# Patient Record
Sex: MALE | Race: WHITE | NOT HISPANIC OR LATINO | Employment: OTHER | URBAN - METROPOLITAN AREA
[De-identification: names, ages, dates, MRNs, and addresses within clinical notes are randomized per-mention and may not be internally consistent; named-entity substitution may affect disease eponyms.]

---

## 2018-07-27 ENCOUNTER — HOSPITAL ENCOUNTER (EMERGENCY)
Facility: HOSPITAL | Age: 78
Discharge: HOME | End: 2018-07-27
Attending: EMERGENCY MEDICINE
Payer: MEDICARE

## 2018-07-27 VITALS
HEART RATE: 60 BPM | SYSTOLIC BLOOD PRESSURE: 119 MMHG | WEIGHT: 175 LBS | RESPIRATION RATE: 18 BRPM | HEIGHT: 70 IN | DIASTOLIC BLOOD PRESSURE: 72 MMHG | TEMPERATURE: 98 F | BODY MASS INDEX: 25.05 KG/M2 | OXYGEN SATURATION: 95 %

## 2018-07-27 DIAGNOSIS — R31.9 HEMATURIA, UNSPECIFIED TYPE: Primary | ICD-10-CM

## 2018-07-27 LAB
AMORPH CRY URNS QL MICRO: ABNORMAL
ANION GAP SERPL CALC-SCNC: 10 MMOL/L
APTT BLDCRRT: 25.1 SEC
BACTERIA #/AREA URNS HPF: ABNORMAL /HPF
BASOPHILS # BLD AUTO: 0.02 K/UL
BASOPHILS NFR BLD: 0.3 %
BILIRUB UR QL STRIP: NEGATIVE
BUN SERPL-MCNC: 27 MG/DL
CALCIUM SERPL-MCNC: 10 MG/DL
CHLORIDE SERPL-SCNC: 109 MMOL/L
CLARITY UR: ABNORMAL
CO2 SERPL-SCNC: 24 MMOL/L
COLOR UR: ABNORMAL
CREAT SERPL-MCNC: 1.2 MG/DL
DIFFERENTIAL METHOD: NORMAL
EOSINOPHIL # BLD AUTO: 0.3 K/UL
EOSINOPHIL NFR BLD: 5.2 %
ERYTHROCYTE [DISTWIDTH] IN BLOOD BY AUTOMATED COUNT: 13.9 %
EST. GFR  (AFRICAN AMERICAN): >60 ML/MIN/1.73 M^2
EST. GFR  (NON AFRICAN AMERICAN): 58 ML/MIN/1.73 M^2
GLUCOSE SERPL-MCNC: 91 MG/DL
GLUCOSE UR QL STRIP: ABNORMAL
HCT VFR BLD AUTO: 43.6 %
HGB BLD-MCNC: 14.5 G/DL
HGB UR QL STRIP: ABNORMAL
HYALINE CASTS #/AREA URNS LPF: 0 /LPF
INR PPP: 1
KETONES UR QL STRIP: ABNORMAL
LEUKOCYTE ESTERASE UR QL STRIP: NEGATIVE
LYMPHOCYTES # BLD AUTO: 1.8 K/UL
LYMPHOCYTES NFR BLD: 27 %
MCH RBC QN AUTO: 30 PG
MCHC RBC AUTO-ENTMCNC: 33.3 G/DL
MCV RBC AUTO: 90 FL
MICROSCOPIC COMMENT: ABNORMAL
MONOCYTES # BLD AUTO: 0.9 K/UL
MONOCYTES NFR BLD: 13.9 %
NEUTROPHILS # BLD AUTO: 3.5 K/UL
NEUTROPHILS NFR BLD: 53.4 %
NITRITE UR QL STRIP: NEGATIVE
PH UR STRIP: 6 [PH] (ref 5–8)
PLATELET # BLD AUTO: 218 K/UL
PMV BLD AUTO: 9.5 FL
POTASSIUM SERPL-SCNC: 3.9 MMOL/L
PROT UR QL STRIP: ABNORMAL
PROTHROMBIN TIME: 10.9 SEC
RBC # BLD AUTO: 4.84 M/UL
RBC #/AREA URNS HPF: >100 /HPF (ref 0–4)
SODIUM SERPL-SCNC: 143 MMOL/L
SP GR UR STRIP: 1.02 (ref 1–1.03)
SQUAMOUS #/AREA URNS HPF: 2 /HPF
URN SPEC COLLECT METH UR: ABNORMAL
UROBILINOGEN UR STRIP-ACNC: NEGATIVE EU/DL
WBC # BLD AUTO: 6.48 K/UL
WBC #/AREA URNS HPF: 0 /HPF (ref 0–5)

## 2018-07-27 PROCEDURE — 85025 COMPLETE CBC W/AUTO DIFF WBC: CPT

## 2018-07-27 PROCEDURE — 85730 THROMBOPLASTIN TIME PARTIAL: CPT

## 2018-07-27 PROCEDURE — 81000 URINALYSIS NONAUTO W/SCOPE: CPT

## 2018-07-27 PROCEDURE — 80048 BASIC METABOLIC PNL TOTAL CA: CPT

## 2018-07-27 PROCEDURE — 85610 PROTHROMBIN TIME: CPT

## 2018-07-27 PROCEDURE — 99283 EMERGENCY DEPT VISIT LOW MDM: CPT

## 2018-07-27 RX ORDER — METOPROLOL SUCCINATE 25 MG/1
25 TABLET, EXTENDED RELEASE ORAL DAILY
COMMUNITY

## 2018-07-27 RX ORDER — ATORVASTATIN CALCIUM 80 MG/1
80 TABLET, FILM COATED ORAL DAILY
COMMUNITY
End: 2020-11-09

## 2018-07-27 NOTE — ED TRIAGE NOTES
Pt states at 2 pm he went to the bathroom to urinate noted a brown blood stain; says it was not painful to urinate. Reports he was gardening,  it was very hot and he was sweating, also did some sit-up today. The pt went back to the bathroom again at 6 pm noted his urine was bright red and decided to come to the ER.

## 2018-07-27 NOTE — ED PROVIDER NOTES
Encounter Date: 7/27/2018  SORT: This is a 78 y.o. male with PMHx prostate cancer who presents for emergent consideration of hematuria. Patient reports 2 episodes on painless hematuria earlier today following strenuous exercise. Initial exam: unrevealing. Patient will be moved to a room when one is available. Orders placed.  MARTHA Cyr PA-C        History     Chief Complaint   Patient presents with    Hematuria     Pt with hx of prostate cancer reports 2 episodes of hematuria today. Denies dysuria     HPI  Review of patient's allergies indicates:  No Known Allergies  Past Medical History:   Diagnosis Date    Cancer     MI (myocardial infarction) 2013    pt reported     Past Surgical History:   Procedure Laterality Date    CARDIAC SURGERY       History reviewed. No pertinent family history.  Social History   Substance Use Topics    Smoking status: Former Smoker     Quit date: 05/2018    Smokeless tobacco: Never Used    Alcohol use No     Review of Systems    Physical Exam     Initial Vitals [07/27/18 1819]   BP Pulse Resp Temp SpO2   115/72 73 18 98.2 °F (36.8 °C) 96 %      MAP       --         Physical Exam    ED Course   Procedures  Labs Reviewed   URINALYSIS, REFLEX TO URINE CULTURE - Abnormal; Notable for the following:        Result Value    Color, UA Red (*)     Appearance, UA Cloudy (*)     Protein, UA 2+ (*)     Glucose, UA 1+ (*)     Ketones, UA Trace (*)     Occult Blood UA 2+ (*)     All other components within normal limits    Narrative:     Preferred Collection Type->Urine, Clean Catch   URINALYSIS MICROSCOPIC - Abnormal; Notable for the following:     RBC, UA >100 (*)     All other components within normal limits    Narrative:     Preferred Collection Type->Urine, Clean Catch   BASIC METABOLIC PANEL - Abnormal; Notable for the following:     BUN, Bld 27 (*)     eGFR if non  58 (*)     All other components within normal limits   PROTIME-INR   CBC W/ AUTO DIFFERENTIAL   APTT           Imaging Results    None                            ED Course as of Jul 27 2128 Fri Jul 27, 2018   1858 Hematuria Occult Blood UA: (!) 2+ [NO]   1858 Ketone urea Ketones, UA: (!) Trace [NO]   1942 Unremarkable WBC: 6.48 [NO]   1942 Unremarkable Hemoglobin: 14.5 [NO]   1945 Awaiting creatinine  [NO]   1959 Unremarkable Coumadin Monitoring INR: 1.0 [NO]   1959 Unremarkable aPTT: 25.1 [NO]   2008 Unremarkable Creatinine: 1.2 [NO]      ED Course User Index  [NO] Ambika Jeffers MD     Clinical Impression:   The encounter diagnosis was Hematuria, unspecified type.          _____________________________________________________________________    ED Provider Note    Chief complaint:    HPI: 78 y.o. male PMHx prostate cancer presents with painless hematuria that started around 2:00 p.m. today.  He states that he felt like his urine was really brown.  He denies any recent trauma.  It is mild and constant but has been improving since he was able to drink some water.  He states that he switch from a baby aspirin to a full aspirin recently.  He denies any abdominal discomfort, nausea, vomiting, chest pain, shortness of breath, fevers.  This has not happened to him before.  Past Medical History:   Diagnosis Date    Cancer     MI (myocardial infarction) 2013    pt reported     Past Surgical History:   Procedure Laterality Date    CARDIAC SURGERY       Social History     Social History    Marital status:      Spouse name: N/A    Number of children: N/A    Years of education: N/A     Occupational History    Not on file.     Social History Main Topics    Smoking status: Former Smoker     Quit date: 05/2018    Smokeless tobacco: Never Used    Alcohol use No    Drug use: No    Sexual activity: No     Other Topics Concern    Not on file     Social History Narrative    No narrative on file     Review of patient's allergies indicates:  No Known Allergies    ROS  ROS per history of present  "illness  Constitutional: Negative for activity change, appetite change, diaphoresis and unexpected weight change.   HENT: Negative for dental problem, drooling, ear pain and hearing loss.    Eyes: Negative for pain, discharge, redness and itching.   Musculoskeletal: Negative for arthralgias, gait problem, joint swelling and neck stiffness.   Skin: Negative for color change, pallor, rash and wound.   Allergic/Immunologic: Negative for environmental allergies, food allergies and immunocompromised state.   Neurological: Negative for tremors, seizures, facial asymmetry and speech difficulty.   Hematological: Negative for adenopathy. Does not bruise/bleed easily.   Psychiatric/Behavioral: Negative for agitation and dysphoric mood.   All other systems reviewed and are negative.      PHYSICAL EXAM:  Vitals:    07/27/18 1819 07/27/18 2027   BP: 115/72 119/72   Pulse: 73 60   Resp: 18 18   Temp: 98.2 °F (36.8 °C) 97.7 °F (36.5 °C)   TempSrc: Oral Oral   SpO2: 96% 95%   Weight: 79.4 kg (175 lb)    Height: 5' 10" (1.778 m)        Vital signs and nursing assessment noted:  Afebrile  GEN:   NAD, A & Ox3, atraumatic, well appearing, nontoxic appearing  HEENT:  PERRLA, EOMI, moist membranes, nl conjunctiva, no scleral icterus, no nystagmus, no nodes/nodules, soft, supple, FROM, no tracheal deviation  CV:   RRR no m/r/g, 2+ radial pulses, <2sec cap refill, no obvious JVD  RESP:  CTA B, no w/r/r, equal and bilateral chest rise, no respiratory distress  ABD:   soft, Nontender, Nondistended, +BS, no guarding/rebound  BACK:  FROM, no midline tenderness, no paraspinal tenderness  :   Deferred  EXT:   FROM, STEINBERG x 4, no edema, no calf tenderness, no swelling  LYMPH:  no gross adenopathy  NEURO:  CN II-XII grossly intact, no obvious motor/sensory deficit, no tremor, nl coordination  PSYCH:   no SI/HI, no anxiety, nl mood/affect, no SI/HI, no AH/VH  SKIN:  Warm, dry, intact, no rashes/lesions or masses, nl color, no pallor    Labs " Reviewed   URINALYSIS, REFLEX TO URINE CULTURE - Abnormal; Notable for the following:        Result Value    Color, UA Red (*)     Appearance, UA Cloudy (*)     Protein, UA 2+ (*)     Glucose, UA 1+ (*)     Ketones, UA Trace (*)     Occult Blood UA 2+ (*)     All other components within normal limits    Narrative:     Preferred Collection Type->Urine, Clean Catch   URINALYSIS MICROSCOPIC - Abnormal; Notable for the following:     RBC, UA >100 (*)     All other components within normal limits    Narrative:     Preferred Collection Type->Urine, Clean Catch   BASIC METABOLIC PANEL - Abnormal; Notable for the following:     BUN, Bld 27 (*)     eGFR if non  58 (*)     All other components within normal limits   PROTIME-INR   CBC W/ AUTO DIFFERENTIAL   APTT     No orders to display       MEDICAL DECISION MAKIN y.o. male past medical history of prostate cancer presents with brown urine.  Exam shows hematuria.  Old records reviewed:  Admitted for an ST-elevation MI in 2013.  Differential diagnosis includes but not exclusive to:  Coagulopathy, thrombocytopenia, Kidney stone, Bladder/prostate tumor, urinary tract infection, malingering.  Unlikely rhabdomyolysis.    Treatment plan includes physical exam, cardiac monitoring, labs,   and supportive care.  Labs reviewed and independently interpreted:    ED Course as of    1858 Hematuria Occult Blood UA: (!) 2+ [NO]    Ketone urea Ketones, UA: (!) Trace [NO]    Unremarkable WBC: 6.48 [NO]    Unremarkable Hemoglobin: 14.5 [NO]    Awaiting creatinine  [NO]    Unremarkable Coumadin Monitoring INR: 1.0 [NO]    Unremarkable aPTT: 25.1 [NO]    Unremarkable Creatinine: 1.2 [NO]      ED Course User Index  [NO] Ambika Jeffers MD    Patient states that he is going on a trip to [facility] in 2-3 days for 1 month and will do a followup there after.  Discussed risk and benefits of awaiting that long  for evaluation.  Patient states that he will consider that assessment and make his final decision sometime between now and Monday.    Patient improved after reassurance/observation.  Medications - No data to display    Patient is tolerating PO and ambulating with steady gait.    Patient updated on care and  agrees with assessment, disposition and treatment plan.  She has no further questions or complaints at this time. Given return precautions and demonstrates understanding.    Patient will  follow up with Urology and/or primary care physician as an outpatient.  Prescription given:  NONE    CLINICAL IMPRESSION:  1. Hematuria, unspecified type        DISPOSITION: Discharged to home under stable conditions                        Ambika Jeffers MD  07/27/18 6666

## 2020-10-02 ENCOUNTER — HOSPITAL ENCOUNTER (INPATIENT)
Facility: HOSPITAL | Age: 80
LOS: 2 days | Discharge: HOME HEALTH | DRG: 063 | End: 2020-10-04
Attending: EMERGENCY MEDICINE | Admitting: PSYCHIATRY & NEUROLOGY
Payer: MEDICARE

## 2020-10-02 DIAGNOSIS — I63.449 CEREBROVASCULAR ACCIDENT (CVA) DUE TO EMBOLISM OF CEREBELLAR ARTERY, UNSPECIFIED BLOOD VESSEL LATERALITY: ICD-10-CM

## 2020-10-02 DIAGNOSIS — R27.0 ATAXIA: ICD-10-CM

## 2020-10-02 DIAGNOSIS — I71.21 ASCENDING AORTIC ANEURYSM: ICD-10-CM

## 2020-10-02 DIAGNOSIS — I25.10 CORONARY ARTERY DISEASE WITHOUT ANGINA PECTORIS, UNSPECIFIED VESSEL OR LESION TYPE, UNSPECIFIED WHETHER NATIVE OR TRANSPLANTED HEART: ICD-10-CM

## 2020-10-02 DIAGNOSIS — I63.9 STROKE: Primary | ICD-10-CM

## 2020-10-02 DIAGNOSIS — Z72.0 TOBACCO ABUSE: ICD-10-CM

## 2020-10-02 DIAGNOSIS — I63.9 CEREBROVASCULAR ACCIDENT (CVA), UNSPECIFIED MECHANISM: ICD-10-CM

## 2020-10-02 DIAGNOSIS — Z92.82 STATUS POST ADMINISTRATION OF TPA (RTPA) IN A DIFFERENT FACILITY WITHIN THE LAST 24 HOURS PRIOR TO ADMISSION TO CURRENT FACILITY: ICD-10-CM

## 2020-10-02 DIAGNOSIS — I10 ESSENTIAL HYPERTENSION: ICD-10-CM

## 2020-10-02 DIAGNOSIS — I63.449: ICD-10-CM

## 2020-10-02 LAB
ABO + RH BLD: NORMAL
ALBUMIN SERPL BCP-MCNC: 3.6 G/DL (ref 3.5–5.2)
ALP SERPL-CCNC: 62 U/L (ref 55–135)
ALT SERPL W/O P-5'-P-CCNC: 16 U/L (ref 10–44)
ANION GAP SERPL CALC-SCNC: 9 MMOL/L (ref 8–16)
AST SERPL-CCNC: 19 U/L (ref 10–40)
BASOPHILS # BLD AUTO: 0.06 K/UL (ref 0–0.2)
BASOPHILS NFR BLD: 0.7 % (ref 0–1.9)
BILIRUB SERPL-MCNC: 0.6 MG/DL (ref 0.1–1)
BILIRUB UR QL STRIP: NEGATIVE
BLD GP AB SCN CELLS X3 SERPL QL: NORMAL
BNP SERPL-MCNC: 118 PG/ML (ref 0–99)
BUN SERPL-MCNC: 19 MG/DL (ref 8–23)
CALCIUM SERPL-MCNC: 9.6 MG/DL (ref 8.7–10.5)
CHLORIDE SERPL-SCNC: 110 MMOL/L (ref 95–110)
CHOLEST SERPL-MCNC: 82 MG/DL (ref 120–199)
CHOLEST SERPL-MCNC: 88 MG/DL (ref 120–199)
CHOLEST/HDLC SERPL: 2.1 {RATIO} (ref 2–5)
CHOLEST/HDLC SERPL: 2.6 {RATIO} (ref 2–5)
CLARITY UR REFRACT.AUTO: CLEAR
CO2 SERPL-SCNC: 28 MMOL/L (ref 23–29)
COLOR UR AUTO: YELLOW
CREAT SERPL-MCNC: 0.9 MG/DL (ref 0.5–1.4)
CTP QC/QA: YES
DIFFERENTIAL METHOD: ABNORMAL
EOSINOPHIL # BLD AUTO: 0.4 K/UL (ref 0–0.5)
EOSINOPHIL NFR BLD: 4.3 % (ref 0–8)
ERYTHROCYTE [DISTWIDTH] IN BLOOD BY AUTOMATED COUNT: 13.1 % (ref 11.5–14.5)
EST. GFR  (AFRICAN AMERICAN): >60 ML/MIN/1.73 M^2
EST. GFR  (NON AFRICAN AMERICAN): >60 ML/MIN/1.73 M^2
ESTIMATED AVG GLUCOSE: 114 MG/DL (ref 68–131)
GLUCOSE SERPL-MCNC: 144 MG/DL (ref 70–110)
GLUCOSE SERPL-MCNC: 148 MG/DL (ref 70–110)
GLUCOSE SERPL-MCNC: 164 MG/DL (ref 70–110)
GLUCOSE UR QL STRIP: NEGATIVE
HBA1C MFR BLD HPLC: 5.6 % (ref 4–5.6)
HCT VFR BLD AUTO: 40.9 % (ref 40–54)
HDLC SERPL-MCNC: 34 MG/DL (ref 40–75)
HDLC SERPL-MCNC: 39 MG/DL (ref 40–75)
HDLC SERPL: 38.6 % (ref 20–50)
HDLC SERPL: 47.6 % (ref 20–50)
HGB BLD-MCNC: 13.4 G/DL (ref 14–18)
HGB UR QL STRIP: NEGATIVE
IMM GRANULOCYTES # BLD AUTO: 0.04 K/UL (ref 0–0.04)
IMM GRANULOCYTES NFR BLD AUTO: 0.5 % (ref 0–0.5)
INR PPP: 1 (ref 0.8–1.2)
KETONES UR QL STRIP: NEGATIVE
LDLC SERPL CALC-MCNC: 31.2 MG/DL (ref 63–159)
LDLC SERPL CALC-MCNC: 39.8 MG/DL (ref 63–159)
LEUKOCYTE ESTERASE UR QL STRIP: NEGATIVE
LYMPHOCYTES # BLD AUTO: 1.8 K/UL (ref 1–4.8)
LYMPHOCYTES NFR BLD: 19.9 % (ref 18–48)
MCH RBC QN AUTO: 30.4 PG (ref 27–31)
MCHC RBC AUTO-ENTMCNC: 32.8 G/DL (ref 32–36)
MCV RBC AUTO: 93 FL (ref 82–98)
MICROSCOPIC COMMENT: NORMAL
MONOCYTES # BLD AUTO: 1 K/UL (ref 0.3–1)
MONOCYTES NFR BLD: 11.5 % (ref 4–15)
NEUTROPHILS # BLD AUTO: 5.5 K/UL (ref 1.8–7.7)
NEUTROPHILS NFR BLD: 63.1 % (ref 38–73)
NITRITE UR QL STRIP: NEGATIVE
NONHDLC SERPL-MCNC: 43 MG/DL
NONHDLC SERPL-MCNC: 54 MG/DL
NRBC BLD-RTO: 0 /100 WBC
PH UR STRIP: 7 [PH] (ref 5–8)
PLATELET # BLD AUTO: 232 K/UL (ref 150–350)
PMV BLD AUTO: 9.4 FL (ref 9.2–12.9)
POCT GLUCOSE: 144 MG/DL (ref 70–110)
POCT GLUCOSE: 164 MG/DL (ref 70–110)
POTASSIUM SERPL-SCNC: 3.3 MMOL/L (ref 3.5–5.1)
PROT SERPL-MCNC: 6.4 G/DL (ref 6–8.4)
PROT UR QL STRIP: NEGATIVE
PROTHROMBIN TIME: 11.1 SEC (ref 9–12.5)
RBC # BLD AUTO: 4.41 M/UL (ref 4.6–6.2)
RBC #/AREA URNS AUTO: 3 /HPF (ref 0–4)
SARS-COV-2 RDRP RESP QL NAA+PROBE: NEGATIVE
SODIUM SERPL-SCNC: 147 MMOL/L (ref 136–145)
SP GR UR STRIP: >=1.03 (ref 1–1.03)
SQUAMOUS #/AREA URNS AUTO: 0 /HPF
TRIGL SERPL-MCNC: 59 MG/DL (ref 30–150)
TRIGL SERPL-MCNC: 71 MG/DL (ref 30–150)
TROPONIN I SERPL DL<=0.01 NG/ML-MCNC: <0.006 NG/ML (ref 0–0.03)
TSH SERPL DL<=0.005 MIU/L-ACNC: 3.09 UIU/ML (ref 0.4–4)
URN SPEC COLLECT METH UR: ABNORMAL
WBC # BLD AUTO: 8.78 K/UL (ref 3.9–12.7)
WBC #/AREA URNS AUTO: 1 /HPF (ref 0–5)

## 2020-10-02 PROCEDURE — 85025 COMPLETE CBC W/AUTO DIFF WBC: CPT

## 2020-10-02 PROCEDURE — 25500020 PHARM REV CODE 255: Performed by: EMERGENCY MEDICINE

## 2020-10-02 PROCEDURE — 37195 THROMBOLYTIC THERAPY STROKE: CPT

## 2020-10-02 PROCEDURE — 83036 HEMOGLOBIN GLYCOSYLATED A1C: CPT

## 2020-10-02 PROCEDURE — 99223 PR INITIAL HOSPITAL CARE,LEVL III: ICD-10-PCS | Mod: ,,, | Performed by: NURSE PRACTITIONER

## 2020-10-02 PROCEDURE — 84484 ASSAY OF TROPONIN QUANT: CPT

## 2020-10-02 PROCEDURE — 99223 1ST HOSP IP/OBS HIGH 75: CPT | Mod: ,,, | Performed by: NURSE PRACTITIONER

## 2020-10-02 PROCEDURE — 99205 PR OFFICE/OUTPT VISIT, NEW, LEVL V, 60-74 MIN: ICD-10-PCS | Mod: GT,,, | Performed by: PSYCHIATRY & NEUROLOGY

## 2020-10-02 PROCEDURE — 83880 ASSAY OF NATRIURETIC PEPTIDE: CPT

## 2020-10-02 PROCEDURE — 93010 EKG 12-LEAD: ICD-10-PCS | Mod: ,,, | Performed by: INTERNAL MEDICINE

## 2020-10-02 PROCEDURE — 99285 EMERGENCY DEPT VISIT HI MDM: CPT | Mod: 25

## 2020-10-02 PROCEDURE — 80053 COMPREHEN METABOLIC PANEL: CPT

## 2020-10-02 PROCEDURE — 84443 ASSAY THYROID STIM HORMONE: CPT

## 2020-10-02 PROCEDURE — 99205 OFFICE O/P NEW HI 60 MIN: CPT | Mod: GT,,, | Performed by: PSYCHIATRY & NEUROLOGY

## 2020-10-02 PROCEDURE — 93005 ELECTROCARDIOGRAM TRACING: CPT

## 2020-10-02 PROCEDURE — 99223 PR INITIAL HOSPITAL CARE,LEVL III: ICD-10-PCS | Mod: ,,, | Performed by: PSYCHIATRY & NEUROLOGY

## 2020-10-02 PROCEDURE — 85610 PROTHROMBIN TIME: CPT

## 2020-10-02 PROCEDURE — 63600175 PHARM REV CODE 636 W HCPCS: Mod: JG | Performed by: EMERGENCY MEDICINE

## 2020-10-02 PROCEDURE — 86850 RBC ANTIBODY SCREEN: CPT

## 2020-10-02 PROCEDURE — 84443 ASSAY THYROID STIM HORMONE: CPT | Mod: 91

## 2020-10-02 PROCEDURE — 80061 LIPID PANEL: CPT

## 2020-10-02 PROCEDURE — 99223 1ST HOSP IP/OBS HIGH 75: CPT | Mod: ,,, | Performed by: PSYCHIATRY & NEUROLOGY

## 2020-10-02 PROCEDURE — 81001 URINALYSIS AUTO W/SCOPE: CPT

## 2020-10-02 PROCEDURE — 82962 GLUCOSE BLOOD TEST: CPT

## 2020-10-02 PROCEDURE — U0002 COVID-19 LAB TEST NON-CDC: HCPCS | Performed by: EMERGENCY MEDICINE

## 2020-10-02 PROCEDURE — 12000002 HC ACUTE/MED SURGE SEMI-PRIVATE ROOM

## 2020-10-02 PROCEDURE — 93010 EKG 12-LEAD: ICD-10-PCS | Mod: 77,,, | Performed by: INTERNAL MEDICINE

## 2020-10-02 PROCEDURE — 93010 ELECTROCARDIOGRAM REPORT: CPT | Mod: 77,,, | Performed by: INTERNAL MEDICINE

## 2020-10-02 PROCEDURE — 93010 ELECTROCARDIOGRAM REPORT: CPT | Mod: ,,, | Performed by: INTERNAL MEDICINE

## 2020-10-02 PROCEDURE — 25000003 PHARM REV CODE 250: Performed by: NURSE PRACTITIONER

## 2020-10-02 PROCEDURE — 80061 LIPID PANEL: CPT | Mod: 91

## 2020-10-02 RX ORDER — ONDANSETRON 2 MG/ML
4 INJECTION INTRAMUSCULAR; INTRAVENOUS EVERY 8 HOURS PRN
Status: DISCONTINUED | OUTPATIENT
Start: 2020-10-02 | End: 2020-10-05 | Stop reason: HOSPADM

## 2020-10-02 RX ORDER — SODIUM CHLORIDE 9 MG/ML
INJECTION, SOLUTION INTRAVENOUS
Status: ACTIVE | OUTPATIENT
Start: 2020-10-02 | End: 2020-10-03

## 2020-10-02 RX ORDER — ATORVASTATIN CALCIUM 20 MG/1
40 TABLET, FILM COATED ORAL DAILY
Status: DISCONTINUED | OUTPATIENT
Start: 2020-10-03 | End: 2020-10-05 | Stop reason: HOSPADM

## 2020-10-02 RX ORDER — LABETALOL HCL 20 MG/4 ML
10 SYRINGE (ML) INTRAVENOUS EVERY 4 HOURS PRN
Status: DISCONTINUED | OUTPATIENT
Start: 2020-10-02 | End: 2020-10-04

## 2020-10-02 RX ORDER — ONDANSETRON 2 MG/ML
4 INJECTION INTRAMUSCULAR; INTRAVENOUS
Status: ACTIVE | OUTPATIENT
Start: 2020-10-02 | End: 2020-10-03

## 2020-10-02 RX ORDER — ACETAMINOPHEN 325 MG/1
650 TABLET ORAL EVERY 6 HOURS PRN
Status: DISCONTINUED | OUTPATIENT
Start: 2020-10-02 | End: 2020-10-05 | Stop reason: HOSPADM

## 2020-10-02 RX ORDER — AMOXICILLIN 250 MG
1 CAPSULE ORAL 2 TIMES DAILY
Status: DISCONTINUED | OUTPATIENT
Start: 2020-10-02 | End: 2020-10-05 | Stop reason: HOSPADM

## 2020-10-02 RX ORDER — CLOPIDOGREL BISULFATE 75 MG/1
75 TABLET ORAL DAILY
COMMUNITY

## 2020-10-02 RX ADMIN — DOCUSATE SODIUM 50MG AND SENNOSIDES 8.6MG 1 TABLET: 8.6; 5 TABLET, FILM COATED ORAL at 10:10

## 2020-10-02 RX ADMIN — IOHEXOL 100 ML: 350 INJECTION, SOLUTION INTRAVENOUS at 08:10

## 2020-10-02 RX ADMIN — ALTEPLASE 7 MG: KIT at 07:10

## 2020-10-02 RX ADMIN — ALTEPLASE 61 MG: KIT at 07:10

## 2020-10-02 NOTE — SUBJECTIVE & OBJECTIVE
Past Medical History:   Diagnosis Date    Cancer     MI (myocardial infarction)     pt reported     Past Surgical History:   Procedure Laterality Date    CARDIAC SURGERY       History reviewed. No pertinent family history.  Social History     Tobacco Use    Smoking status: Light Tobacco Smoker     Types: Cigarettes     Last attempt to quit: 2018     Years since quittin.4    Smokeless tobacco: Never Used    Tobacco comment: 2 cigarettes/day   Substance Use Topics    Alcohol use: No    Drug use: No     Review of patient's allergies indicates:  No Known Allergies    Medications: I have reviewed the current medication administration record.    (Not in a hospital admission)      Review of Systems   Constitutional: Positive for activity change. Negative for fatigue.   HENT: Negative for facial swelling and nosebleeds.    Eyes: Negative for discharge and visual disturbance.   Respiratory: Negative for apnea and choking.    Cardiovascular: Negative for chest pain and leg swelling.   Gastrointestinal: Positive for nausea and vomiting.   Musculoskeletal: Positive for gait problem. Negative for joint swelling.   Skin: Negative for rash and wound.   Neurological: Positive for speech difficulty and numbness. Negative for facial asymmetry and weakness.   Psychiatric/Behavioral: Negative for confusion and decreased concentration.     Objective:     Vital Signs (Most Recent):  Temp: 97.7 °F (36.5 °C) (10/02/20 2058)  Pulse: 64 (10/02/20 2119)  Resp: 20 (10/02/20 2113)  BP: (!) 164/74 (10/02/20 2113)  SpO2: 99 % (10/02/20 2113)    Vital Signs Range (Last 24H):  Temp:  [97.6 °F (36.4 °C)-97.7 °F (36.5 °C)]   Pulse:  [56-64]   Resp:  [15-21]   BP: (149-180)/(73-89)   SpO2:  [96 %-100 %]     Physical Exam  Constitutional:       General: He is not in acute distress.  HENT:      Head: Normocephalic and atraumatic.   Eyes:      Extraocular Movements: Extraocular movements intact.      Conjunctiva/sclera:  Conjunctivae normal.   Cardiovascular:      Rate and Rhythm: Normal rate.   Pulmonary:      Effort: Pulmonary effort is normal. No respiratory distress.   Musculoskeletal: Normal range of motion.         General: No signs of injury.   Feet:      Right foot:      Skin integrity: Dry skin present.      Left foot:      Skin integrity: Dry skin present.   Skin:     General: Skin is warm and dry.   Neurological:      Mental Status: He is alert and oriented to person, place, and time.      Sensory: Sensory deficit present.      Motor: No weakness.   Psychiatric:         Mood and Affect: Mood normal.         Behavior: Behavior normal.         Neurological Exam:   LOC: alert  Attention Span: Good   Language: No aphasia  Articulation: Dysarthria  Orientation: Person, Place, Time   Visual Fields: Full  EOM (CN III, IV, VI): Full/intact  Facial Movement (CN VII): Symmetric facial expression    Motor: Arm left  Normal 5/5  Leg left  Normal 5/5  Arm right  Normal 5/5  Leg right Normal 5/5  Cebellar: Upper Extremity Appendicular Ataxia (Finger Nose Finger)  Right  Sensation: Intact to light touch, temp; reported subjective numbness of R hand  Tone: Normal tone throughout      Laboratory:  CMP:   Recent Labs   Lab 10/02/20  1903   CALCIUM 9.6   ALBUMIN 3.6   PROT 6.4   *   K 3.3*   CO2 28      BUN 19   CREATININE 0.9   ALKPHOS 62   ALT 16   AST 19   BILITOT 0.6     CBC:   Recent Labs   Lab 10/02/20  1903   WBC 8.78   RBC 4.41*   HGB 13.4*   HCT 40.9      MCV 93   MCH 30.4   MCHC 32.8     Lipid Panel:   Recent Labs   Lab 10/02/20  1903   CHOL 88*   LDLCALC 39.8*   HDL 34*   TRIG 71     Coagulation:   Recent Labs   Lab 10/02/20  1903   INR 1.0     Hgb A1C: No results for input(s): HGBA1C in the last 168 hours.  TSH:   Recent Labs   Lab 10/02/20  1903   TSH 3.089       Diagnostic Results:      Brain/Vessel imaging:    CTA Stroke Multiphase 10/2/20  Stable appearance of noncontrast CT head.  Additional evaluation,  as clinically warranted.  No acute abnormality on CTA evaluation of the head/neck.  No high-grade stenosis, occlusion, or aneurysm.

## 2020-10-02 NOTE — HPI
Kwan Ramos is a 80 y.o. male with PMHx MI s/p stent x 2 (on Plavix, statin), skin CA (R facial/nose s/p recent resection), tobacco abuse who presented as a transfer from [facility] for evaluation of dizziness and ataxia. Pt was LKN 1730 when he acutely developed unsteady gait, dizziness, and dysarthria. CTH at OSH demonstrated no acute abnormalities. Telestroke consult performed by Dr. Hall and tPA administered. Transferred to [facility] for further evaluation/possible intervention.  On arrival, pt alert, oriented, able to give a history, and moving all extremities against gravity with some subjective R hand numbness, RUE dysmetria, and mildly slurred speech. He states his symptoms seem to be improving since tPA. Taken for stat CTA Multiphase.    Patient later reports that he was in his usual health prior to onset of symptoms when he first noticed that he didn't seem himself, felt apathetic; reportedly took a baby aspirin. Pt states that his gait had seemed subtley off for about the last week or so then was notably significantly worse today where he felt off balance and as though he was leaning to one side. He also c/o dizziness, N/V, and difficulty using his R hand at that time, but denies any numbness/tingling, HA, or vision changes.  Pt denies personal hx stroke, blood clots, seizure, cardiac arrhythmia; takes Plavix daily at home. He lives alone and performs all ADLs independently. Pt smokes ~2 cigarettes per day, states he has occasionally smoked marijuana for glaucoma (none recently in months); denies alcohol use.

## 2020-10-02 NOTE — SUBJECTIVE & OBJECTIVE
"  Woke up with symptoms?: {YES NO:19925}    Recent bleeding noted: {Yes / No / Unknown:74}  Does the patient take any Blood Thinners? {Yes / No / Unknown:74}  Medications: {[facility] Medications:52171}      Past Medical History: {[facility] MEDICAL HX:62888}    Past Surgical History: {[facility] SURGICAL HX:44631}    Family History: {[facility] MEDICAL HX:07444}    Social History: {[facility] SOCIAL HX:04210}    Allergies: No Known Allergies {[facility] ALLERGIES:55038}    Review of Systems   Constitutional: Negative for appetite change, chills and fever.   HENT: Negative for congestion and sore throat.    Eyes: Negative for discharge and itching.   Respiratory: Negative for apnea and shortness of breath.    Cardiovascular: Negative for chest pain and palpitations.   Gastrointestinal: Positive for nausea. Negative for abdominal pain and anal bleeding.   Endocrine: Negative for cold intolerance and polydipsia.   Genitourinary: Negative for dysuria and hematuria.   Musculoskeletal: Negative for joint swelling and myalgias.   Skin: Negative for color change and rash.   Neurological: Negative for tremors.   Psychiatric/Behavioral: Negative for hallucinations and self-injury.     Objective:   Vitals: Blood pressure (!) 180/86, pulse (!) 56, temperature 97.6 °F (36.4 °C), temperature source Oral, resp. rate 20, height 5' 9" (1.753 m), SpO2 96 %.     CT READ: {[facility] CT READ:50020}    Physical Exam  Constitutional:       General: He is not in acute distress.  HENT:      Head: Atraumatic.      Right Ear: External ear normal.      Left Ear: External ear normal.   Eyes:      General: No scleral icterus.     Conjunctiva/sclera: Conjunctivae normal.   Neck:      Musculoskeletal: Normal range of motion.   Pulmonary:      Effort: Pulmonary effort is normal.   Abdominal:      General: There is no distension.      Tenderness: There is no guarding.   Musculoskeletal: Normal range of motion.         General: No deformity. "   Skin:     General: Skin is warm and dry.   Neurological:      Mental Status: He is alert.

## 2020-10-03 PROBLEM — I10 HTN (HYPERTENSION): Status: ACTIVE | Noted: 2020-10-03

## 2020-10-03 LAB
ANION GAP SERPL CALC-SCNC: 14 MMOL/L (ref 8–16)
BASOPHILS # BLD AUTO: 0.06 K/UL (ref 0–0.2)
BASOPHILS NFR BLD: 0.7 % (ref 0–1.9)
BUN SERPL-MCNC: 18 MG/DL (ref 8–23)
CALCIUM SERPL-MCNC: 9.5 MG/DL (ref 8.7–10.5)
CHLORIDE SERPL-SCNC: 110 MMOL/L (ref 95–110)
CO2 SERPL-SCNC: 22 MMOL/L (ref 23–29)
CREAT SERPL-MCNC: 0.9 MG/DL (ref 0.5–1.4)
DIFFERENTIAL METHOD: ABNORMAL
EOSINOPHIL # BLD AUTO: 0.2 K/UL (ref 0–0.5)
EOSINOPHIL NFR BLD: 1.9 % (ref 0–8)
ERYTHROCYTE [DISTWIDTH] IN BLOOD BY AUTOMATED COUNT: 13.2 % (ref 11.5–14.5)
EST. GFR  (AFRICAN AMERICAN): >60 ML/MIN/1.73 M^2
EST. GFR  (NON AFRICAN AMERICAN): >60 ML/MIN/1.73 M^2
GLUCOSE SERPL-MCNC: 92 MG/DL (ref 70–110)
HCT VFR BLD AUTO: 39.8 % (ref 40–54)
HGB BLD-MCNC: 12.8 G/DL (ref 14–18)
IMM GRANULOCYTES # BLD AUTO: 0.02 K/UL (ref 0–0.04)
IMM GRANULOCYTES NFR BLD AUTO: 0.2 % (ref 0–0.5)
LYMPHOCYTES # BLD AUTO: 1.8 K/UL (ref 1–4.8)
LYMPHOCYTES NFR BLD: 21.6 % (ref 18–48)
MAGNESIUM SERPL-MCNC: 2 MG/DL (ref 1.6–2.6)
MCH RBC QN AUTO: 30.5 PG (ref 27–31)
MCHC RBC AUTO-ENTMCNC: 32.2 G/DL (ref 32–36)
MCV RBC AUTO: 95 FL (ref 82–98)
MONOCYTES # BLD AUTO: 0.9 K/UL (ref 0.3–1)
MONOCYTES NFR BLD: 11.1 % (ref 4–15)
NEUTROPHILS # BLD AUTO: 5.3 K/UL (ref 1.8–7.7)
NEUTROPHILS NFR BLD: 64.5 % (ref 38–73)
NRBC BLD-RTO: 0 /100 WBC
PHOSPHATE SERPL-MCNC: 3.1 MG/DL (ref 2.7–4.5)
PLATELET # BLD AUTO: 239 K/UL (ref 150–350)
PMV BLD AUTO: 9.6 FL (ref 9.2–12.9)
POCT GLUCOSE: 106 MG/DL (ref 70–110)
POCT GLUCOSE: 136 MG/DL (ref 70–110)
POTASSIUM SERPL-SCNC: 3.3 MMOL/L (ref 3.5–5.1)
RBC # BLD AUTO: 4.19 M/UL (ref 4.6–6.2)
SODIUM SERPL-SCNC: 146 MMOL/L (ref 136–145)
TSH SERPL DL<=0.005 MIU/L-ACNC: 1.97 UIU/ML (ref 0.4–4)
WBC # BLD AUTO: 8.27 K/UL (ref 3.9–12.7)

## 2020-10-03 PROCEDURE — 25500020 PHARM REV CODE 255: Performed by: PSYCHIATRY & NEUROLOGY

## 2020-10-03 PROCEDURE — 84100 ASSAY OF PHOSPHORUS: CPT

## 2020-10-03 PROCEDURE — A9585 GADOBUTROL INJECTION: HCPCS | Performed by: PSYCHIATRY & NEUROLOGY

## 2020-10-03 PROCEDURE — 92610 EVALUATE SWALLOWING FUNCTION: CPT

## 2020-10-03 PROCEDURE — 97116 GAIT TRAINING THERAPY: CPT

## 2020-10-03 PROCEDURE — 25000003 PHARM REV CODE 250: Performed by: NURSE PRACTITIONER

## 2020-10-03 PROCEDURE — 97161 PT EVAL LOW COMPLEX 20 MIN: CPT

## 2020-10-03 PROCEDURE — 80048 BASIC METABOLIC PNL TOTAL CA: CPT

## 2020-10-03 PROCEDURE — 99233 PR SUBSEQUENT HOSPITAL CARE,LEVL III: ICD-10-PCS | Mod: GC,,, | Performed by: PSYCHIATRY & NEUROLOGY

## 2020-10-03 PROCEDURE — 20000000 HC ICU ROOM

## 2020-10-03 PROCEDURE — 63600175 PHARM REV CODE 636 W HCPCS: Performed by: NURSE PRACTITIONER

## 2020-10-03 PROCEDURE — 11000001 HC ACUTE MED/SURG PRIVATE ROOM

## 2020-10-03 PROCEDURE — 97802 MEDICAL NUTRITION INDIV IN: CPT

## 2020-10-03 PROCEDURE — 99233 SBSQ HOSP IP/OBS HIGH 50: CPT | Mod: ,,, | Performed by: PSYCHIATRY & NEUROLOGY

## 2020-10-03 PROCEDURE — 99233 SBSQ HOSP IP/OBS HIGH 50: CPT | Mod: GC,,, | Performed by: PSYCHIATRY & NEUROLOGY

## 2020-10-03 PROCEDURE — 97530 THERAPEUTIC ACTIVITIES: CPT

## 2020-10-03 PROCEDURE — 97165 OT EVAL LOW COMPLEX 30 MIN: CPT

## 2020-10-03 PROCEDURE — 85025 COMPLETE CBC W/AUTO DIFF WBC: CPT

## 2020-10-03 PROCEDURE — 83735 ASSAY OF MAGNESIUM: CPT

## 2020-10-03 PROCEDURE — 99233 PR SUBSEQUENT HOSPITAL CARE,LEVL III: ICD-10-PCS | Mod: ,,, | Performed by: PSYCHIATRY & NEUROLOGY

## 2020-10-03 PROCEDURE — 94761 N-INVAS EAR/PLS OXIMETRY MLT: CPT

## 2020-10-03 RX ORDER — ABIRATERONE 500 MG/1
500 TABLET ORAL DAILY
COMMUNITY

## 2020-10-03 RX ORDER — HEPARIN SODIUM 5000 [USP'U]/ML
5000 INJECTION, SOLUTION INTRAVENOUS; SUBCUTANEOUS EVERY 8 HOURS
Status: DISCONTINUED | OUTPATIENT
Start: 2020-10-03 | End: 2020-10-05 | Stop reason: HOSPADM

## 2020-10-03 RX ORDER — LATANOPROST 50 UG/ML
1 SOLUTION/ DROPS OPHTHALMIC NIGHTLY
COMMUNITY

## 2020-10-03 RX ORDER — ASPIRIN 81 MG/1
81 TABLET ORAL DAILY
Status: DISCONTINUED | OUTPATIENT
Start: 2020-10-03 | End: 2020-10-05 | Stop reason: HOSPADM

## 2020-10-03 RX ORDER — TAMSULOSIN HYDROCHLORIDE 0.4 MG/1
0.4 CAPSULE ORAL DAILY
COMMUNITY

## 2020-10-03 RX ORDER — GADOBUTROL 604.72 MG/ML
8 INJECTION INTRAVENOUS
Status: COMPLETED | OUTPATIENT
Start: 2020-10-03 | End: 2020-10-03

## 2020-10-03 RX ORDER — CLOPIDOGREL BISULFATE 75 MG/1
75 TABLET ORAL DAILY
Status: DISCONTINUED | OUTPATIENT
Start: 2020-10-03 | End: 2020-10-05 | Stop reason: HOSPADM

## 2020-10-03 RX ORDER — ASPIRIN 81 MG/1
81 TABLET ORAL DAILY
Status: DISCONTINUED | OUTPATIENT
Start: 2020-10-04 | End: 2020-10-03

## 2020-10-03 RX ORDER — CLOPIDOGREL BISULFATE 75 MG/1
75 TABLET ORAL DAILY
Status: DISCONTINUED | OUTPATIENT
Start: 2020-10-04 | End: 2020-10-03

## 2020-10-03 RX ADMIN — HEPARIN SODIUM 5000 UNITS: 5000 INJECTION INTRAVENOUS; SUBCUTANEOUS at 09:10

## 2020-10-03 RX ADMIN — CLOPIDOGREL 75 MG: 75 TABLET, FILM COATED ORAL at 09:10

## 2020-10-03 RX ADMIN — GADOBUTROL 8 ML: 604.72 INJECTION INTRAVENOUS at 04:10

## 2020-10-03 RX ADMIN — ATORVASTATIN CALCIUM 40 MG: 20 TABLET, FILM COATED ORAL at 08:10

## 2020-10-03 RX ADMIN — DOCUSATE SODIUM 50MG AND SENNOSIDES 8.6MG 1 TABLET: 8.6; 5 TABLET, FILM COATED ORAL at 09:10

## 2020-10-03 RX ADMIN — DOCUSATE SODIUM 50MG AND SENNOSIDES 8.6MG 1 TABLET: 8.6; 5 TABLET, FILM COATED ORAL at 08:10

## 2020-10-03 RX ADMIN — ASPIRIN 81 MG: 81 TABLET, COATED ORAL at 09:10

## 2020-10-03 NOTE — PLAN OF CARE
Recommendations  1. Continue regular diet as tolerated.   2. If PO intake < 50%, add Boost Plus TID.   3. RD to monitor.     Goals: Adequate PO intake to meet > 75% EEN/EPN by RD follow up  Nutrition Goal Status: new  Communication of RD Recs: other (comment)(POC)

## 2020-10-03 NOTE — ASSESSMENT & PLAN NOTE
2/2 stroke - Initial presentation of gait instability, which reportedly had been present but mild for about the last week, then acutely worsened day of presentation. Mild RUE dysmetria on exam  PT, OT eval & treat

## 2020-10-03 NOTE — PLAN OF CARE
Problem: Occupational Therapy Goal  Goal: Occupational Therapy Goal  Description: Goals to be met by: 10/13     Patient will increase functional independence with ADLs by performing:    UE Dressing with Modified [place].  LE Dressing with Modified independence.  Grooming while standing with Modified [place].  Toileting from toilet with Modified [place] for hygiene and clothing management.   Supine to sit with Modified [place].  Step transfer with Modified [place]    Outcome: Ongoing, Progressing    OT eval completed.

## 2020-10-03 NOTE — SUBJECTIVE & OBJECTIVE
Interval Hisory:  Patient doing well, stable. SANTOSH NOLAN. tPA window completes later this evening. MRI Brain this afternoon to assess for changes. Plan for likely step down to vascular neurology this evening.    Past Medical History:   Diagnosis Date    Cancer     HTN (hypertension) 10/3/2020    MI (myocardial infarction)     pt reported     Past Surgical History:   Procedure Laterality Date    CARDIAC SURGERY        No current facility-administered medications on file prior to encounter.      Current Outpatient Medications on File Prior to Encounter   Medication Sig Dispense Refill    aspirin (ECOTRIN) 81 MG EC tablet Take 1 tablet (81 mg total) by mouth once daily. 30 tablet 1    atorvastatin (LIPITOR) 10 MG tablet Take 10 mg by mouth once daily.      clopidogreL (PLAVIX) 75 mg tablet Take 75 mg by mouth once daily.      metoprolol succinate (TOPROL-XL) 25 MG 24 hr tablet Take 25 mg by mouth once daily.        Allergies: Patient has no known allergies.    History reviewed. No pertinent family history.  Social History     Tobacco Use    Smoking status: Light Tobacco Smoker     Types: Cigarettes     Last attempt to quit: 2018     Years since quittin.4    Smokeless tobacco: Never Used    Tobacco comment: 2 cigarettes/day   Substance Use Topics    Alcohol use: No    Drug use: No     Review of Systems   Constitutional: Negative.  Negative for fatigue.   HENT: Negative for hearing loss.    Eyes: Negative for visual disturbance.   Respiratory: Negative for chest tightness and shortness of breath.    Cardiovascular: Negative for chest pain, palpitations and leg swelling.   Gastrointestinal: Negative for abdominal distention, nausea and vomiting.   Genitourinary: Negative.    Musculoskeletal: Negative for neck pain and neck stiffness.   Skin: Negative.    Neurological: Negative for syncope, speech difficulty, weakness and numbness.   Psychiatric/Behavioral: Negative.      Objective:      Vitals:    Temp: 98.1 °F (36.7 °C)  Pulse: 61  Rhythm: sinus bradycardia  BP: (!) 179/84  MAP (mmHg): 120  Resp: (!) 27  SpO2: 97 %  O2 Device (Oxygen Therapy): nasal cannula    Temp  Min: 97.6 °F (36.4 °C)  Max: 98.4 °F (36.9 °C)  Pulse  Min: 55  Max: 69  BP  Min: 141/77  Max: 180/86  MAP (mmHg)  Min: 102  Max: 126  Resp  Min: 15  Max: 29  SpO2  Min: 95 %  Max: 100 %    10/02 0701 - 10/03 0700  In: 247 [P.O.:240; I.V.:7]  Out: 350 [Urine:350]   Unmeasured Output  Urine Occurrence: 1       Physical Exam  Constitutional:       General: He is not in acute distress.     Appearance: Normal appearance. He is not ill-appearing.   HENT:      Head: Normocephalic and atraumatic.   Eyes:      Extraocular Movements: Extraocular movements intact.      Conjunctiva/sclera: Conjunctivae normal.      Pupils: Pupils are equal, round, and reactive to light.   Neck:      Musculoskeletal: Normal range of motion and neck supple.   Cardiovascular:      Rate and Rhythm: Normal rate and regular rhythm.      Pulses: Normal pulses.   Pulmonary:      Effort: Pulmonary effort is normal.      Breath sounds: Normal breath sounds.   Abdominal:      Palpations: Abdomen is soft.   Musculoskeletal: Normal range of motion.   Skin:     General: Skin is warm and dry.      Capillary Refill: Capillary refill takes 2 to 3 seconds.   Neurological:      Mental Status: He is alert.      Coordination: Finger-Nose-Finger Test and Heel to Shin Test normal.      Comments:   GCS 15 - patient alert   PERRL 3MM  EOM: Intact   NIH: 2 Mild dysarthria, mild aphasia  LUE: 5/5  RUE: 5/5  LLE: 5/5  RLE: 5/5    Sensation intact: facial, BUEs, LUEs       Today I personally reviewed pertinent medications, lines/drains/airways, imaging, cardiology results, laboratory results, microbiology results, notably:

## 2020-10-03 NOTE — ASSESSMENT & PLAN NOTE
Statin therapy continued   Hold plavix for increased risk of ICH, restart Plavix after MRI  Cardiac stent 2001

## 2020-10-03 NOTE — HPI
Patient is a 80 year-old male with a history of CVA (2014),  MI x 2 stents (2001), atherosclerosis, HLD, skin cancer and smoking admitted from [facility] s/p tPA, end time: 2019.  Pt reports that he was home watching TV around 1730 this evening when he began to experience nausea associated with right upper arm numbness and severe leg weakness. He crawled to his bathroom to take an ASA as he suspected he was having a stroke. He also noted having some slurred speech. He passed some time at home hoping his symptoms would pass. When he regained some strength in his legs, he decided to drive himself to [facility] for treatment. Telestroke consult with [facility], tPA given for dysarthria and mild aphasia. CTA with no LVO. Will admit to [facility] for post tPA protocol.

## 2020-10-03 NOTE — SUBJECTIVE & OBJECTIVE
Neurologic Chief Complaint: R sided ataxia and decrease sensation.     Subjective:     Interval History: NAEON. Pt eating well. significant improvement in neurological examination. Mild R sided cerebellar dysfunction on examination. Walks without assistance.  Repeat MRI pending.    HPI, Past Medical, Family, and Social History remains the same as documented in the initial encounter.     Review of Systems   Constitutional: Negative for activity change, chills, diaphoresis and fever.   HENT: Negative for drooling and trouble swallowing.    Respiratory: Negative for cough, chest tightness and shortness of breath.    Gastrointestinal: Negative for nausea and vomiting.   Genitourinary: Negative for frequency and hematuria.   Skin: Negative for pallor and rash.   Neurological: Negative for dizziness, tremors, seizures, facial asymmetry, speech difficulty, weakness, light-headedness, numbness and headaches.   Psychiatric/Behavioral: Negative for agitation, behavioral problems, confusion and decreased concentration.     Scheduled Meds:   atorvastatin  40 mg Oral Daily    senna-docusate 8.6-50 mg  1 tablet Oral BID     Continuous Infusions:  PRN Meds:acetaminophen, labetalol, ondansetron    Objective:     Vital Signs (Most Recent):  Temp: 98.1 °F (36.7 °C) (10/03/20 0715)  Pulse: (!) 55 (10/03/20 1415)  Resp: 17 (10/03/20 1415)  BP: 135/76 (10/03/20 1415)  SpO2: 96 % (10/03/20 1415)  BP Location: Right arm    Vital Signs Range (Last 24H):  Temp:  [97.6 °F (36.4 °C)-98.4 °F (36.9 °C)]   Pulse:  [55-69]   Resp:  [15-29]   BP: (135-180)/(73-92)   SpO2:  [95 %-100 %]   BP Location: Right arm    Physical Exam  Vitals signs and nursing note reviewed.   Constitutional:       General: He is not in acute distress.     Appearance: Normal appearance. He is well-developed. He is not ill-appearing.   HENT:      Head: Normocephalic and atraumatic.      Mouth/Throat:      Mouth: Mucous membranes are moist.   Eyes:      General: No  scleral icterus.     Extraocular Movements: Extraocular movements intact.      Pupils: Pupils are equal, round, and reactive to light.      Comments: L sided nystagmus    Neck:      Musculoskeletal: Normal range of motion and neck supple.      Vascular: No JVD.      Trachea: No tracheal deviation.   Cardiovascular:      Rate and Rhythm: Normal rate and regular rhythm.      Heart sounds: Normal heart sounds. No murmur. No friction rub. No gallop.    Pulmonary:      Effort: Pulmonary effort is normal. No respiratory distress.      Breath sounds: Normal breath sounds. No wheezing or rales.   Chest:      Chest wall: No tenderness.   Abdominal:      General: Bowel sounds are normal. There is no distension.      Palpations: Abdomen is soft.      Tenderness: There is no abdominal tenderness. There is no guarding or rebound.   Musculoskeletal: Normal range of motion.   Lymphadenopathy:      Cervical: No cervical adenopathy.   Skin:     General: Skin is warm and dry.      Capillary Refill: Capillary refill takes less than 2 seconds.      Findings: No rash.   Neurological:      General: No focal deficit present.      Mental Status: He is alert and oriented to person, place, and time.      Motor: No weakness.         Neurological Exam:   LOC: alert  Attention Span: Good   Language: No aphasia  Articulation: No dysarthria  Orientation: Person, Place, Time   Visual Fields: Full  EOM (CN III, IV, VI): Full/intact  Pupils (CN II, III): PERRL  Facial Sensation (CN V): Normal  Facial Movement (CN VII): Symmetric facial expression    Motor: Arm left  Normal 5/5  Leg left  Normal 5/5  Arm right  Normal 5/5  Leg right Normal 5/5  Cebellar: Upper Extremity Appendicular Ataxia (Finger Nose Finger)  Right  Sensation: Intact to light touch, temperature and vibration    Laboratory:  CBC:   Recent Labs   Lab 10/03/20  0312   WBC 8.27   RBC 4.19*   HGB 12.8*   HCT 39.8*      MCV 95   MCH 30.5   MCHC 32.2     Lipid Panel:   Recent Labs    Lab 10/02/20  2231   CHOL 82*   LDLCALC 31.2*   HDL 39*   TRIG 59     Hgb A1C:   Recent Labs   Lab 10/02/20  2231   HGBA1C 5.6     TSH:   Recent Labs   Lab 10/02/20  1903   TSH 3.089       Diagnostic Results     Brain Imaging   CTH WO 10/2) Punctate foci of low attenuation within the right basal ganglia, suggest sequela of prominent perivascular spaces however age-indeterminate infarcts cannot be excluded given that there are no previous examinations available for comparison. Sinus disease, noting chronic component involving the right maxillary sinus. Sequela of chronic microvascular ischemic change and senescent change.  Vessel Imaging   CTA 10/2) No acute abnormality on CTA evaluation of the head/neck.  No high-grade stenosis, occlusion, or aneurysm.   Cardiac Imaging   TTE- Pending

## 2020-10-03 NOTE — PT/OT/SLP EVAL
"Physical Therapy Evaluation    Patient Name:  Kwan Ramos   MRN:  [id number]  Admit Date: 10/2/2020  Admitting Diagnosis:  Embolic stroke involving cerebellar artery  Length of Stay: 1 days  Recent Surgery: * No surgery found *      Recommendations:     Discharge Recommendations:  home   Discharge Equipment Recommendations: none   Barriers to discharge: Inaccessible home and Decreased caregiver support    Assessment:     Kwan Ramos is a 80 y.o. male admitted with a medical diagnosis of Embolic stroke involving cerebellar artery. Medical history includes CVA, MI, atherosclerosis, and skin cancer. He is most limited by decreased balance. Today, he was able to perform all upright mobility with minimal assistance and no device. Based on clinical presentation and co-morbidities, pt would benefit from acute skilled physical therapy services to improve functional mobility and return to max capacity prior level of function. See detailed evaluation below:    Problem List: impaired endurance, impaired self care skills, impaired functional mobilty, gait instability, impaired balance, impaired coordination  Rehab Prognosis: Good; patient would benefit from acute skilled PT services to address these deficits and reach maximum level of function.      Plan:     During this hospitalization, patient to be seen 4 x/week to address the identified rehab impairments via gait training, therapeutic activities, therapeutic exercises, neuromuscular re-education and progress towards the established goals.    · Plan of Care Expires:  11/02/20    Subjective   Communicated with RN prior to session.  Patient found supine upon PT entry to room, agreeable to evaluation.     Chief Complaint: slurred speech  Patient/Family Comments/goals: "to do jania chi"  Pain/Comfort:  · Pain Rating 1: 0/10  · Pain Rating Post-Intervention 1: 0/10    Living Environment:  Patient lives alone in a second floor apartment with a flight of stairs to get into the " "unit. He denies any recent falls.     Prior Level of Function:   Patient reports being independent with mobility & with ADLs. Patient uses DME as follows: none. DME owned (not currently used): none.    Roles/Repsonsibilities:   Work: Retired, but likes to volunteer. Drive: yes. Managing Medicines/Managing Home: yes.     Patient reports they will have assistance from no one upon discharge.    Objective:   Patient found with: telemetry, SCD, pulse ox (continuous), blood pressure cuff     General Precautions: Standard, fall   Orthopedic Precautions:N/A   Braces: N/A   Oxygen Device: Room Air  Vitals: BP (!) 173/85 (BP Location: Right arm, Patient Position: Lying)   Pulse (!) 57   Temp 98.1 °F (36.7 °C) (Oral)   Resp (!) 29   Ht 5' 9" (1.753 m)   Wt 74.8 kg (164 lb 15.9 oz)   SpO2 95%   BMI 24.37 kg/m²     Exams:  · Cognition:   · Alert and Cooperative  · AxOx4  · Command following: Follows multistep  commands  · Fluency: slurred speech  · Hearing: Intact  · Vision:  Intact visual fields  · Skin Integrity: intact  · Sensation: intact  · Coordination: impaired R UE  · LE Strength:  · L Lower Extremity: WFL  · R Lower Extremity: WFL  · LE ROM:  · L Lower Extremity: WFL  · R Lower Extremity: WFL      Outcome Measures:  AM-PAC 6 CLICK MOBILITY  Turning over in bed (including adjusting bedclothes, sheets and blankets)?: 4  Sitting down on and standing up from a chair with arms (e.g., wheelchair, bedside commode, etc.): 3  Moving from lying on back to sitting on the side of the bed?: 4  Moving to and from a bed to a chair (including a wheelchair)?: 3  Need to walk in hospital room?: 3  Climbing 3-5 steps with a railing?: 3  Basic Mobility Total Score: 20     Functional Mobility:    Bed Mobility:  · Supine to Sit: stand by assistance  · Scooting anteriorly to EOB to have both feet planted on floor: contact guard assistance    Sitting Balance at Edge of Bed:   Assistance Level Required: Stand-by Assistance   Postural " deviations noted: none noted    Transfers:   · Sit <> Stand Transfer: minimum assistance with no assistive device   · Standing Tolerance: stand by assistance with no assistive device   · Deviations: none  · Bed <> Chair Transfer: Stand Pivot technique with minimum assistance with no assistive device    Gait:   · Patient ambulated: ~25 feet   · Patient required: minimal assist  · Patient used: no assistive device  · Gait Deviation(s): decreased speed    Education:   Patient was educated on the following:   Role of PT, plan of care, and goals   In room safety and use of call button   Importance of continued upright mobility and exercise      Patient left up in chair with all lines intact, call button in reach, and RN notified.    GOALS:   Multidisciplinary Problems     Physical Therapy Goals        Problem: Physical Therapy Goal    Goal Priority Disciplines Outcome Goal Variances Interventions   Physical Therapy Goal     PT, PT/OT Ongoing, Progressing     Description: PT goals to be met by: 10/13/20    Patient will perform sit <> stand transitions independently.  Patient will perform transfers from bed <> chair or BSC independently.  Patient will ambulate 200 feet with supervision and no device.  Patient will ascend/descend 1 flight of steps using R handrails with supervision.                   History:     Past Medical History:   Diagnosis Date    Cancer     HTN (hypertension) 10/3/2020    MI (myocardial infarction) 2013    pt reported       Past Surgical History:   Procedure Laterality Date    CARDIAC SURGERY         Time Tracking:     PT Received On: 10/03/20  PT Start Time: 0912     PT Stop Time: 0931  PT Total Time (min): 19 min       Billable Minutes: Evaluation 9 and Gait Training 10    Claudia Remy PT, DPT  10/3/2020

## 2020-10-03 NOTE — ASSESSMENT & PLAN NOTE
Stroke risk factor - pt states he smokes ~2 cigarettes per day and has for many years  Nicotine patch PRN  Counseled pt on importance of cessation for secondary stroke prevention. He conveys plans to quit cold turkey

## 2020-10-03 NOTE — PROGRESS NOTES
[facility]  Neurocritical Care  Progress Note    Admit Date: 10/2/2020  Service Date: 10/03/2020  Length of Stay: 1    Subjective:     Chief Complaint: Embolic stroke involving cerebellar artery    History of Present Illness: Patient is a 80 year-old male with a history of CVA (2014),  MI x 2 stents (2001), atherosclerosis, HLD, skin cancer and smoking admitted from [facility] s/p tPA, end time: 2019.  Pt reports that he was home watching TV around 1730 this evening when he began to experience nausea associated with right upper arm numbness and severe leg weakness. He crawled to his bathroom to take an ASA as he suspected he was having a stroke. He also noted having some slurred speech. He passed some time at home hoping his symptoms would pass. When he regained some strength in his legs, he decided to drive himself to [facility] for treatment. Telestroke consult with [facility], tPA given for dysarthria and mild aphasia. CTA with no LVO. Will admit to [facility] for post tPA protocol.         Hospital Course: 10/02: Admit to [facility] for post tPA monitoring   10/03/2020 Patient doing well, stable. NAEON, AFVSS. tPA window completes later this evening. MRI Brain this afternoon to assess for changes. Plan for likely step down to vascular neurology this evening.      Interval Hisory:  Patient doing well, stable. NAEON, AFVSS. tPA window completes later this evening. MRI Brain this afternoon to assess for changes. Plan for likely step down to vascular neurology this evening.    Past Medical History:   Diagnosis Date    Cancer     HTN (hypertension) 10/3/2020    MI (myocardial infarction) 2013    pt reported     Past Surgical History:   Procedure Laterality Date    CARDIAC SURGERY        No current facility-administered medications on file prior to encounter.      Current Outpatient Medications on File Prior to Encounter   Medication Sig Dispense Refill    aspirin (ECOTRIN) 81 MG EC tablet Take 1 tablet (81 mg total) by mouth  once daily. 30 tablet 1    atorvastatin (LIPITOR) 10 MG tablet Take 10 mg by mouth once daily.      clopidogreL (PLAVIX) 75 mg tablet Take 75 mg by mouth once daily.      metoprolol succinate (TOPROL-XL) 25 MG 24 hr tablet Take 25 mg by mouth once daily.        Allergies: Patient has no known allergies.    History reviewed. No pertinent family history.  Social History     Tobacco Use    Smoking status: Light Tobacco Smoker     Types: Cigarettes     Last attempt to quit: 2018     Years since quittin.4    Smokeless tobacco: Never Used    Tobacco comment: 2 cigarettes/day   Substance Use Topics    Alcohol use: No    Drug use: No     Review of Systems   Constitutional: Negative.  Negative for fatigue.   HENT: Negative for hearing loss.    Eyes: Negative for visual disturbance.   Respiratory: Negative for chest tightness and shortness of breath.    Cardiovascular: Negative for chest pain, palpitations and leg swelling.   Gastrointestinal: Negative for abdominal distention, nausea and vomiting.   Genitourinary: Negative.    Musculoskeletal: Negative for neck pain and neck stiffness.   Skin: Negative.    Neurological: Negative for syncope, speech difficulty, weakness and numbness.   Psychiatric/Behavioral: Negative.      Objective:     Vitals:    Temp: 98.1 °F (36.7 °C)  Pulse: 61  Rhythm: sinus bradycardia  BP: (!) 179/84  MAP (mmHg): 120  Resp: (!) 27  SpO2: 97 %  O2 Device (Oxygen Therapy): nasal cannula    Temp  Min: 97.6 °F (36.4 °C)  Max: 98.4 °F (36.9 °C)  Pulse  Min: 55  Max: 69  BP  Min: 141/77  Max: 180/86  MAP (mmHg)  Min: 102  Max: 126  Resp  Min: 15  Max: 29  SpO2  Min: 95 %  Max: 100 %    10/02 0701 - 10/03 0700  In: 247 [P.O.:240; I.V.:7]  Out: 350 [Urine:350]   Unmeasured Output  Urine Occurrence: 1       Physical Exam  Constitutional:       General: He is not in acute distress.     Appearance: Normal appearance. He is not ill-appearing.   HENT:      Head: Normocephalic and atraumatic.    Eyes:      Extraocular Movements: Extraocular movements intact.      Conjunctiva/sclera: Conjunctivae normal.      Pupils: Pupils are equal, round, and reactive to light.   Neck:      Musculoskeletal: Normal range of motion and neck supple.   Cardiovascular:      Rate and Rhythm: Normal rate and regular rhythm.      Pulses: Normal pulses.   Pulmonary:      Effort: Pulmonary effort is normal.      Breath sounds: Normal breath sounds.   Abdominal:      Palpations: Abdomen is soft.   Musculoskeletal: Normal range of motion.   Skin:     General: Skin is warm and dry.      Capillary Refill: Capillary refill takes 2 to 3 seconds.   Neurological:      Mental Status: He is alert.      Coordination: Finger-Nose-Finger Test and Heel to Shin Test normal.      Comments:   GCS 15 - patient alert   PERRL 3MM  EOM: Intact   NIH: 2 Mild dysarthria, mild aphasia  LUE: 5/5  RUE: 5/5  LLE: 5/5  RLE: 5/5    Sensation intact: facial, BUEs, LUEs       Today I personally reviewed pertinent medications, lines/drains/airways, imaging, cardiology results, laboratory results, microbiology results, notably:        Assessment/Plan:     Neuro  * Embolic stroke involving cerebellar artery  80 year-old male s/t tPA from outside facility presenting with symptoms concerning for cerebellar infarct, tPA end time of 2019.    Vascular neurology consulted  CTA: no LVA, no acute findings  A1c 5.6  LDL 31.6   Admit to [facility] for q1hr neurochecks   SBP < 180  MRI today 2030  Low threshold to rescan for exam change  PT/OT/SLP   Patient likely step-down to vascular neurology this evening after MRI  Continue home ASA and Plavix if MRI stable  Continue Statin therapy        Cardiac/Vascular  HTN (hypertension)  SBP goal < 180  PRN labetalol  ECHO pending       CAD (coronary artery disease)  Statin therapy continued   Hold plavix for increased risk of ICH, restart Plavix after MRI  Cardiac stent 2001          The patient is being Prophylaxed for:  Venous  Thromboembolism with: None  Stress Ulcer with: None  Ventilator Pneumonia with: none    Activity Orders          Diet Adult Regular (IDDSI Level 7) [facility]: Regular starting at 10/03 0076        Full Code    Antonio Ding MD  Neurocritical Care  [facility]

## 2020-10-03 NOTE — CONSULTS
"  [facility]  Adult Nutrition  Consult Note    SUMMARY     Recommendations  1. Continue regular diet as tolerated.   2. If PO intake < 50%, add Boost Plus TID.   3. RD to monitor.    Goals: Adequate PO intake to meet > 75% EEN/EPN by RD follow up  Nutrition Goal Status: new  Communication of RD Recs: other (comment)(POC)    Reason for Assessment    Reason For Assessment: consult  Diagnosis: stroke/CVA  Relevant Medical History: CVA, HLD, skin ca  General Information Comments: Pt reports good appetite today and states she consumed most of breakfast this AM. She reports slowly decreasing appetite over the past week, otherwise good intake prior. Pt denies wt changes PTA and reports her UBW is 164-165#. Stable wt 165-175# x 2 years per chart. NFPE completed today with age appropriate wasting noted. Pt does not meet criteria for malnutrition at this time, but is at risk if decreased appetite continues.  Nutrition Discharge Planning: Adequate PO intake on cardiac diet    Nutrition Risk Screen    Nutrition Risk Screen: no indicators present    Nutrition/Diet History    Spiritual, Cultural Beliefs, [employer]alism Practices, Values that Affect Care: no    Anthropometrics    Temp: 97.1 °F (36.2 °C)  Height Method: Measured  Height: 5' 9" (175.3 cm)  Height (inches): 69 in  Weight Method: Bed Scale  Weight: 74.8 kg (164 lb 15.9 oz)  Weight (lb): 164.99 lb  Ideal Body Weight (IBW), Male: 160 lb  % Ideal Body Weight, Male (lb): 103.12 %  BMI (Calculated): 24.4    Lab/Procedures/Meds    Pertinent Labs Reviewed: reviewed  Pertinent Labs Comments: Na 146, K 3.3  Pertinent Medications Reviewed: reviewed  Pertinent Medications Comments: statin, senna-docusate    Estimated/Assessed Needs    Weight Used For Calorie Calculations: 74.8 kg (164 lb 14.5 oz)  Energy Calorie Requirements (kcal): 1823 kcal/day  Energy Need Method: [place](x 1.25 PAL)  Protein Requirements: 75-90 g/day(1-1.2 g/kg)  Weight Used For " Protein Calculations: 74.8 kg (164 lb 14.5 oz)  Fluid Requirements (mL): per MD or 1 mL/kcal     RDA Method (mL): 1823    Nutrition Prescription Ordered    Current Diet Order: Regular    Evaluation of Received Nutrient/Fluid Intake    Comments: LBM 10/2  % Intake of Estimated Energy Needs: 75 - 100 %  % Meal Intake: 75 - 100 %    Nutrition Risk    Level of Risk/Frequency of Follow-up: low     Assessment and Plan    Nutrition Problem  Inadequate energy intake    Related to (etiology):   Decreased appetite    Signs and Symptoms (as evidenced by):   Pt reports decreased appetite x 1 week PTA with nausea after eating x 1 day    Interventions (treatment strategy):  Collaboration with other providers    Nutrition Diagnosis Status:   New    Monitor and Evaluation    Food and Nutrient Intake: energy intake, food and beverage intake  Food and Nutrient Adminstration: diet order  Anthropometric Measurements: weight, weight change, body mass index  Biochemical Data, Medical Tests and Procedures: electrolyte and renal panel, gastrointestinal profile, glucose/endocrine profile, inflammatory profile, lipid profile  Nutrition-Focused Physical Findings: overall appearance     Malnutrition Assessment  Orbital Region (Subcutaneous Fat Loss): well nourished  Upper Arm Region (Subcutaneous Fat Loss): mild depletion   [place] Region (Muscle Loss): mild depletion  Clavicle Bone Region (Muscle Loss): mild depletion  Clavicle and Acromion Bone Region (Muscle Loss): well nourished  Dorsal Hand (Muscle Loss): mild depletion  Anterior Thigh Region (Muscle Loss): mild depletion  Posterior Calf Region (Muscle Loss): moderate depletion     Nutrition Follow-Up    RD Follow-up?: Yes

## 2020-10-03 NOTE — PT/OT/SLP EVAL
Speech Language Pathology Evaluation  Bedside Swallow    Patient Name:  Kwan Ramos   MRN:  [id number]  Admitting Diagnosis: Embolic stroke involving cerebellar artery    Recommendations:                 General Recommendations:  Speech language evaluation  Diet recommendations:  Regular, Thin   Aspiration Precautions: Meds whole 1 at a time and Standard aspiration precautions   General Precautions: Standard, aspiration, fall  Communication strategies:  none    History:     Past Medical History:   Diagnosis Date    Cancer     HTN (hypertension) 10/3/2020    MI (myocardial infarction) 2013    pt reported       Past Surgical History:   Procedure Laterality Date    CARDIAC SURGERY         Social History: Patient lives alone.    Prior Intubation HX:  None this admission    Modified Barium Swallow: none this admission    Chest X-Rays: 10/2/20:  No acute intrathoracic abnormality detected.    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    Subjective     Pt was awake and alert in NAD.  He was agreeable to evaluation  Patient goals: none expressed     Pain/Comfort:  · Pain Rating 1: 0/10  · Pain Rating Post-Intervention 1: 0/10    Objective:     Oral Musculature Evaluation  · Oral Musculature: WFL  · Dentition: scattered dentition  · Secretion Management: adequate  · Mucosal Quality: adequate  · Mandibular Strength and Mobility: WFL  · Oral Labial Strength and Mobility: WFL  · Lingual Strength and Mobility: WFL  · Velar Elevation: WFL  · Buccal Strength and Mobility: WFL  · Volitional Cough: adequate  · Volitional Swallow: timely  · Voice Prior to PO Intake: clear    Bedside Swallow Eval:   Consistencies Assessed:  · Thin liquids tsp, cup and straw sips  · Puree tsp bites  · Solids self fed bites     Oral Phase:   · WNL    Pharyngeal Phase:    · WNL  · no overt clinical signs/symptoms of aspiration  · no overt clinical signs/symptoms of pharyngeal dysphagia    Compensatory Strategies  · None    Treatment: Education was provided to pt re: SLP role, eval results, diet recs, aspiration precautions and SLP POC.  He indicated good understanding and agreed with recommendations.    Assessment:     Kwan Ramos is a 80 y.o. male with an SLP diagnosis of Dysphagia.  He presents with a normal oropharyngeal swallow at this time.  He would benefit from ongoing Skilled Speech services for further evaluation of cognitive-communication skills.    Goals:   Multidisciplinary Problems     SLP Goals        Problem: SLP Goal    Goal Priority Disciplines Outcome   SLP Goal     SLP Ongoing, Progressing   Description: Speech Language Pathology Goals  Goals expected to be met by 10/10/20    1.  Pt will complete Speech Language Cognitive evaluation to determine the need for additional goals.                           Plan:     · Patient to be seen:  3 x/week   · Plan of Care expires:  11/01/20  · Plan of Care reviewed with:  patient   · SLP Follow-Up:  Yes       Discharge recommendations:  other (see comments)(pending pt progress and pt/ot recs)   Barriers to Discharge:  Inaccessible Home Environment    Time Tracking:     SLP Treatment Date:   10/03/20  Speech Start Time:  1055  Speech Stop Time:  1105     Speech Total Time (min):  10 min    Billable Minutes: Eval Swallow and Oral Function 10 minutes    Zeinab Johnson CCC-SLP  10/03/2020

## 2020-10-03 NOTE — ED TRIAGE NOTES
Pt presents to the ED c/o slurred speech and right sided weakness that began approx 5:30 pm tonight. Pt currently AAOX4, PERRLA, slightly slowed slurred speech with slight trouble finding words, non labored respirations, equal bilat  with slight atxia when waling on the RLE.

## 2020-10-03 NOTE — ASSESSMENT & PLAN NOTE
80 y.o. man with PMHx MI s/p stent x 2 (on Plavix, statin), skin CA (R facial/nose s/p recent resection), tobacco abuse who presented to [facility] with gait imbalance, dysarthria, RUE ataxia and reduced sensation. LKN 1730. Telemedicine with Dr. Hall; tPA given. Pt was transferred to [facility] for stat CTA Multiphase. Dr. Mark reviewed images as acquired. No large vessel occlusion; patient not a candidate for IR intervention. Admitted to [facility] for close monitoring/higher level of care.     Exam findings concerning for posterior circulation infarct. MRI Brain pending      Antithrombotics for secondary stroke prevention: Antiplatelets: None: Hold all Antithrombotics x 24 hours after IV t-PA administration  Statins for secondary stroke prevention and hyperlipidemia, if present:   Statins: Atorvastatin- 40 mg daily  Aggressive risk factor modification: Smoking, Diet, Exercise, CAD  Rehab efforts: The patient has been evaluated by a stroke team provider and the therapy needs have been fully considered based off the presenting complaints and exam findings. The following therapy evaluations are needed: PT evaluate and treat, OT evaluate and treat, SLP evaluate and treat  Diagnostics ordered/pending: MRI head without contrast to assess brain parenchyma, TTE to assess cardiac function/status   VTE prophylaxis: Mechanical prophylaxis: Place SCDs  None: Reason for No Pharmacological VTE Prophylaxis: Holding x 24 hours s/p treatment with alteplase (tPA)  BP parameters: Infarct: Post tPA, SBP <180

## 2020-10-03 NOTE — HOSPITAL COURSE
10/02: Admit to [facility] for post tPA monitoring   10/03/2020 Patient doing well, stable. SANTOSH NOLAN. tPA window completes later this evening. MRI Brain this afternoon to assess for changes. Plan for likely step down to vascular neurology this evening.

## 2020-10-03 NOTE — ED PROVIDER NOTES
Encounter Date: 10/2/2020       History     Chief Complaint   Patient presents with    [facility] Transfer-TPA     Patient transferred from [facility] for evaluation after getting TPA. Patient got bolus of 7mg at 1913, infusion 61mg at . Patient presented to ED with slurred speech. Patient COVID negative at [facility].     slow gait     pt drove self to ED,states 1 hr ago his symptoms started    slow speech     HPI     This is a 80-year-old man with a history of hypertension, prior MI, who presents as transfer from an outside hospital with concern for acute stroke.  He presented there with acute onset slurred speech, underwent a head CT that was unremarkable, and he was given tPA.  Shortly thereafter his symptoms began to improve.  He is transferred here for a vascular Neurology evaluation.  Further history is limited due to patient acuity.  Review of patient's allergies indicates:  No Known Allergies  Past Medical History:   Diagnosis Date    Cancer     HTN (hypertension) 10/3/2020    MI (myocardial infarction)     pt reported     Past Surgical History:   Procedure Laterality Date    CARDIAC SURGERY       History reviewed. No pertinent family history.  Social History     Tobacco Use    Smoking status: Light Tobacco Smoker     Types: Cigarettes     Last attempt to quit: 2018     Years since quittin.4    Smokeless tobacco: Never Used    Tobacco comment: 2 cigarettes/day   Substance Use Topics    Alcohol use: No    Drug use: No     Review of Systems   Constitutional: Negative for chills, diaphoresis, fatigue and fever.   HENT: Negative for congestion, rhinorrhea and sore throat.    Eyes: Negative for visual disturbance.   Respiratory: Negative for cough, chest tightness and shortness of breath.    Cardiovascular: Negative for chest pain.   Gastrointestinal: Negative for abdominal pain, blood in stool, constipation, diarrhea and vomiting.   Genitourinary: Negative for dysuria, hematuria and  urgency.   Musculoskeletal: Negative for back pain.   Skin: Negative for rash.   Neurological: Positive for speech difficulty. Negative for seizures and syncope.   Hematological: Does not bruise/bleed easily.   Psychiatric/Behavioral: Negative for agitation and hallucinations.       Physical Exam     Initial Vitals [10/02/20 1834]   BP Pulse Resp Temp SpO2   (!) 180/86 (!) 56 20 97.6 °F (36.4 °C) 96 %      MAP       --         Physical Exam  Gen: AxOx3, NAD, well nourished  Eye: sclera anicteric, EOMI, no conjunctivitis, no periorbital edema  ENT: NCAT, OP clear, neck supple with FROM, no stridor  CVS: RRR, no m/r/g, distal pulses intact/symmetric  Pulm: CTAB, no wheezes, rales or rhonchi, no increased work of breathing  Abd: soft, nontender, nondistended, no organomegaly, no CVAT  Ext: no edema, lesions, rashes, or deformity  Neuro: GCS15, cranial nerves are grossly intact, speech is fluent though he says it is not quite back to baseline, moving all extremities, gait intact  Psych: normal affect, cooperative  ED Course   Procedures  Labs Reviewed   CBC W/ AUTO DIFFERENTIAL - Abnormal; Notable for the following components:       Result Value    RBC 4.41 (*)     Hemoglobin 13.4 (*)     All other components within normal limits   COMPREHENSIVE METABOLIC PANEL - Abnormal; Notable for the following components:    Sodium 147 (*)     Potassium 3.3 (*)     Glucose 148 (*)     All other components within normal limits   LIPID PANEL - Abnormal; Notable for the following components:    Cholesterol 88 (*)     HDL 34 (*)     LDL Cholesterol 39.8 (*)     All other components within normal limits   B-TYPE NATRIURETIC PEPTIDE - Abnormal; Notable for the following components:     (*)     All other components within normal limits   URINALYSIS, REFLEX TO URINE CULTURE - Abnormal; Notable for the following components:    Specific Gravity, UA >=1.030 (*)     All other components within normal limits    Narrative:     Specimen  Source->Urine   LIPID PANEL - Abnormal; Notable for the following components:    Cholesterol 82 (*)     HDL 39 (*)     LDL Cholesterol 31.2 (*)     All other components within normal limits   POCT GLUCOSE, HAND-HELD DEVICE - Abnormal; Notable for the following components:    POC Glucose 164 (*)     All other components within normal limits   POCT GLUCOSE - Abnormal; Notable for the following components:    POCT Glucose 164 (*)     All other components within normal limits   POCT GLUCOSE - Abnormal; Notable for the following components:    POCT Glucose 144 (*)     All other components within normal limits   PROTIME-INR   TSH   TROPONIN I   HEMOGLOBIN A1C   URINALYSIS MICROSCOPIC    Narrative:     Specimen Source->Urine   TSH   CBC W/ AUTO DIFFERENTIAL   MAGNESIUM   PHOSPHORUS   SARS-COV-2 RDRP GENE   TYPE & SCREEN   POCT GLUCOSE MONITORING CONTINUOUS          Imaging Results          CTA STROKE MULTI-PHASE (Final result)  Result time 10/02/20 21:28:04   Procedure changed from CT Head Without Contrast     Final result by Magdiel Romo MD (10/02/20 21:28:04)                 Impression:      Stable appearance of noncontrast CT head.  Additional evaluation, as clinically warranted.    No acute abnormality on CTA evaluation of the head/neck.  No high-grade stenosis, occlusion, or aneurysm.    Findings discussed with Stroke Team (Radha GAY) on 10/02/2020 at 20:58.    Electronically signed by resident: David Carranza  Date:    10/02/2020  Time:    20:44    Electronically signed by: Magdile Romo MD  Date:    10/02/2020  Time:    21:28             Narrative:    EXAMINATION:  CTA STROKE MULTI-PHASE    CLINICAL HISTORY:  dizziness, ataxia;    TECHNIQUE:  Non contrast low dose axial images were obtained thought the head. CT angiogram was performed from the level of the annamarie to the top of the head following the IV administration of 100mL of Omnipaque 350.   Sagittal and coronal reconstructions and maximum intensity projection  reconstructions were performed. Arterial stenosis percentages are based on NASCET measurement criteria.  Two additional phases of immediate post-contrast CTA images were performed through the head alone.    COMPARISON:  CT head without from earlier today.    FINDINGS:  Intracranial Compartment:    Ventricles and sulci are stable in size and configuration without hydrocephalus.  No extra-axial blood or fluid collections.    Stable appearance of brain parenchyma with chronic microvascular ischemic changes and nonspecific focus of hypoattenuation in the right basal ganglia.  No parenchymal mass, hemorrhage, edema, or major vascular distribution infarct.    Skull/Extracranial Contents (limited evaluation): No fracture. Persistent patchy opacification of ethmoid sinuses, near complete opacification of left maxillary sinus, and complete opacification of right maxillary sinus with chronic osseous changes.  Mastoid air cells are essentially clear.    Non-Vascular Structures of the Neck/Thoracic Inlet (limited evaluation): Normal.    Aorta: Left-sided 3 vessel arch with atherosclerotic plaque.  No aneurysm.    Extracranial carotid circulation: No significant atherosclerotic plaque at the bilateral carotid furcation.  No hemodynamically significant stenosis, aneurysmal dilatation, or dissection.    Extracranial vertebral circulation: Tortuosity of left V1 segment of the left vertebral artery.  No hemodynamically significant stenosis, aneurysmal dilatation, or dissection.    Intracranial Arteries: Fetal origin of the left posterior cerebral artery.  Bilateral intracranial internal carotid arteries, anterior cerebral arteries, middle cerebral arteries, posterior cerebral arteries, and vertebrobasilar arteries are patent.  No focal high-grade stenosis, occlusion, or saccular aneurysm.    Venous structures (limited evaluation): Normal.                               CTA STROKE MULTI-PHASE (No Result on File)                X-Ray  Chest AP Portable (Final result)  Result time 10/02/20 19:23:39    Final result by Franca Groves MD (10/02/20 19:23:39)                 Impression:      No acute intrathoracic abnormality detected.      Electronically signed by: Franca Groves  Date:    10/02/2020  Time:    19:23             Narrative:    EXAMINATION:  AP PORTABLE CHEST    CLINICAL HISTORY:  Stroke;    TECHNIQUE:  AP portable chest radiograph was submitted.    COMPARISON:  02/05/2013    FINDINGS:  AP portable chest radiograph demonstrates a cardiac silhouette within normal limits.  There is tortuosity of the descending thoracic aorta.  Vascular calcification is seen at the aortic knob.  There is no focal consolidation, pneumothorax, or pleural effusion.                               CT Head Without Contrast (Final result)  Result time 10/02/20 19:02:32    Final result by Toni Bueno MD (10/02/20 19:02:32)                 Impression:      1. Punctate foci of low attenuation within the right basal ganglia, suggest sequela of prominent perivascular spaces however age-indeterminate infarcts cannot be excluded given that there are no previous examinations available for comparison.  Correlation with current symptomatology is recommended.  2. Sinus disease, noting chronic component involving the right maxillary sinus.  3. Sequela of chronic microvascular ischemic change and senescent change.      Electronically signed by: Toni Bueno MD  Date:    10/02/2020  Time:    19:02             Narrative:    EXAMINATION:  CT HEAD WITHOUT CONTRAST    CLINICAL HISTORY:  Neuro deficit, acute, stroke suspected;    TECHNIQUE:  Low dose axial images were obtained through the head.  Coronal and sagittal reformations were also performed. Contrast was not administered.    COMPARISON:  None.    FINDINGS:  There is motion artifact.    There is generalized cerebral volume loss.  There is hypoattenuation in a periventricular fashion, likely sequela of chronic  microvascular ischemic change.There are a few punctate foci of low attenuation in the region of the right basal ganglia, nonspecific.  There is no evidence of acute major vascular territory infarct, hemorrhage, or mass.  There is no hydrocephalus.  There are no abnormal extra-axial fluid collections.  There is opacification of the bilateral maxillary sinuses, complete on the right, near complete on the left.  There is osseous hypertrophy of the right maxillary sinus suggesting chronic component.  There is patchy opacification of the ethmoid sinuses, otherwise the visualized paranasal sinuses and mastoid air cells are clear, and there is no evidence of calvarial fracture.  The visualized soft tissues are unremarkable.                                 Medical Decision Making:   History:   Old Medical Records: I decided to obtain old medical records.  Old Records Summarized: records from another hospital.  Initial Assessment:   This is a 72-year-old man who presents with no resolved acute dysarthria.  He has been given tPA.  I discussed the patient emergently with vascular Neurology, who recommended a CTA perfusion study which is reportedly without evidence of a large vessel occlusion.  Regardless he is clinically improving.  He will be admitted to the neuro ICU for post tPA care.  Other:   I have discussed this case with another health care provider.                             Clinical Impression:       ICD-10-CM ICD-9-CM   1. Stroke  I63.9 434.91   2. 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 Ataxia  R27.0 781.3   4. Coronary artery disease without angina pectoris, unspecified vessel or lesion type, unspecified whether native or transplanted heart  I25.10 414.00   5. Essential hypertension  I10 401.9   6. Status post administration of tPA (rtPA) in a different facility within the last 24 hours prior to admission to current facility   Z92.82 V45.88   7. Tobacco abuse  Z72.0 305.1                          ED Disposition Condition    Admit                             Alessia Gong MD  10/03/20 0146

## 2020-10-03 NOTE — H&P
[facility]  Neurocritical Care  History & Physical    Admit Date: 10/2/2020  Service Date: 10/2/2020  Length of Stay: 1    Subjective:     Chief Complaint: Embolic stroke involving cerebellar artery    History of Present Illness: Patient is a 80 year-old male with a history of CVA (2014),  MI x 2 stents (2001), atherosclerosis, HLD, skin cancer and smoking admitted from [facility] s/p tPA, end time: 2019.  Pt reports that he was home watching TV around 1730 this evening when he began to experience nausea associated with right upper arm numbness and severe leg weakness. He crawled to his bathroom to take an ASA as he suspected he was having a stroke. He also noted having some slurred speech. He passed some time at home hoping his symptoms would pass. When he regained some strength in his legs, he decided to drive himself to [facility] for treatment. Telestroke consult with [facility], tPA given for dysarthria and mild aphasia. CTA with no LVO. Will admit to [facility] for post tPA protocol.         Past Medical History:   Diagnosis Date    Cancer     MI (myocardial infarction) 2013    pt reported     Past Surgical History:   Procedure Laterality Date    CARDIAC SURGERY        No current facility-administered medications on file prior to encounter.      Current Outpatient Medications on File Prior to Encounter   Medication Sig Dispense Refill    aspirin (ECOTRIN) 81 MG EC tablet Take 1 tablet (81 mg total) by mouth once daily. 30 tablet 1    atorvastatin (LIPITOR) 10 MG tablet Take 10 mg by mouth once daily.      clopidogreL (PLAVIX) 75 mg tablet Take 75 mg by mouth once daily.      metoprolol succinate (TOPROL-XL) 25 MG 24 hr tablet Take 25 mg by mouth once daily.        Allergies: Patient has no known allergies.    History reviewed. No pertinent family history.  Social History     Tobacco Use    Smoking status: Light Tobacco Smoker     Types: Cigarettes     Last attempt to quit: 05/2018     Years since quitting:  2.4    Smokeless tobacco: Never Used    Tobacco comment: 2 cigarettes/day   Substance Use Topics    Alcohol use: No    Drug use: No     Review of Systems   Constitutional: Negative.  Negative for fatigue.   HENT: Negative for hearing loss.    Eyes: Negative for visual disturbance.   Respiratory: Negative for chest tightness and shortness of breath.    Cardiovascular: Negative for chest pain, palpitations and leg swelling.   Gastrointestinal: Positive for nausea. Negative for abdominal distention and vomiting.   Genitourinary: Negative.    Musculoskeletal: Positive for gait problem. Negative for neck pain and neck stiffness.   Skin: Negative.    Neurological: Positive for speech difficulty, weakness and numbness. Negative for syncope.   Psychiatric/Behavioral: Negative.      Objective:     Vitals:    Temp: 97.7 °F (36.5 °C)  Pulse: (!) 58  BP: (!) 160/87  MAP (mmHg): 121  Resp: 16  SpO2: 96 %  O2 Device (Oxygen Therapy): room air    Temp  Min: 97.6 °F (36.4 °C)  Max: 97.7 °F (36.5 °C)  Pulse  Min: 56  Max: 69  BP  Min: 149/73  Max: 180/86  MAP (mmHg)  Min: 103  Max: 126  Resp  Min: 15  Max: 21  SpO2  Min: 95 %  Max: 100 %    No intake/output data recorded.           Physical Exam  Constitutional:       General: He is not in acute distress.     Appearance: Normal appearance. He is not ill-appearing.   HENT:      Head: Normocephalic and atraumatic.   Eyes:      Extraocular Movements: Extraocular movements intact.      Conjunctiva/sclera: Conjunctivae normal.      Pupils: Pupils are equal, round, and reactive to light.   Neck:      Musculoskeletal: Normal range of motion and neck supple.   Cardiovascular:      Rate and Rhythm: Normal rate and regular rhythm.      Pulses: Normal pulses.   Pulmonary:      Effort: Pulmonary effort is normal.      Breath sounds: Normal breath sounds.   Abdominal:      Palpations: Abdomen is soft.   Musculoskeletal: Normal range of motion.   Skin:     General: Skin is warm and dry.       Capillary Refill: Capillary refill takes 2 to 3 seconds.   Neurological:      Mental Status: He is alert.      Coordination: Finger-Nose-Finger Test and Heel to Shin Test normal.      Comments:   GCS 15 - patient alert   PERRL 3MM  EOM: Intact   NIH: 2 Mild dysarthria, mild aphasia  LUE: 5/5  RUE: 5/5  LLE: 5/5  RLE: 5/5    Sensation intact: facial, BUEs, LUEs       Today I personally reviewed pertinent medications, lines/drains/airways, imaging, cardiology results, laboratory results, microbiology results, notably:        Assessment/Plan:     Neuro  * Embolic stroke involving cerebellar artery  80 year-old male s/t tPA from outside facility presenting with symptoms concerning for cerebellar infarct, tPA end time of 2019.    Vascular neurology consulted  CTA: no LVA, no acute findings  HgbA1C, lipid panel pending   Admit to [facility] for q1hr neurochecks   SBP < 180  MRI tomorrow 2030  Low threshold to rescan for exam change  PT/OT/SLP       Cardiac/Vascular  HTN (hypertension)  SBP goal < 180  PRN labetalol  ECHO pending       CAD (coronary artery disease)  Statin therapy continued   Hold plavix for increased risk of ICH   Cardiac stent 2001          The patient is being Prophylaxed for:  Venous Thromboembolism with: Mechanical  Stress Ulcer with: Not Applicable   Ventilator Pneumonia with: not applicable    Activity Orders          Diet NPO: NPO starting at 10/02 2218        Full Code     I spent spent > 35 minutes reviewing patient records, examining, and [place] of the patient with greater than 50% of the time spent with direct patient care and coordination. Level 3      Layne Keller DNP  Neurocritical Care  [facility]

## 2020-10-03 NOTE — ED PROVIDER NOTES
Encounter Date: 10/2/2020       History     Chief Complaint   Patient presents with    slow speech     pt drove self to ED,states 1 hr ago his symptoms started    slow gait     HPI     72-year-old male past medical history MI status post PCI, presents with nausea, ataxia, slurred speech.  Patient reports the symptoms began around 1 hr ago, and had some difficulty walking did not fall, but had to crawl to the car.  He reports being able to make it here, but feels extremely weak in his right upper extremity, has some notable weakness on his right lower side, and some slurred speech.  He reports no symptoms like this previously reports vomiting in the CT scanner and reports feeling a little bit better.      Review of patient's allergies indicates:  No Known Allergies  Past Medical History:   Diagnosis Date    Cancer     MI (myocardial infarction)     pt reported     Past Surgical History:   Procedure Laterality Date    CARDIAC SURGERY       No family history on file.  Social History     Tobacco Use    Smoking status: Former Smoker     Quit date: 2018     Years since quittin.4    Smokeless tobacco: Never Used   Substance Use Topics    Alcohol use: No    Drug use: No     Review of Systems   Constitutional: Negative.    HENT: Negative.    Eyes: Negative.    Respiratory: Negative.    Cardiovascular: Negative.    Gastrointestinal: Negative.    Genitourinary: Negative.    Musculoskeletal: Negative.    Skin: Negative.    Neurological: Positive for speech difficulty and weakness.       Physical Exam     Initial Vitals [10/02/20 1834]   BP Pulse Resp Temp SpO2   (!) 180/86 (!) 56 20 97.6 °F (36.4 °C) 96 %      MAP       --         Physical Exam    Nursing note and vitals reviewed.  Constitutional: He appears well-developed and well-nourished. He is not diaphoretic. He appears distressed (mildly).   HENT:   Head: Normocephalic and atraumatic.   Nose: Nose normal.   Mouth/Throat: No oropharyngeal exudate.    Eyes: EOM are normal. Pupils are equal, round, and reactive to light.   Neck: Normal range of motion. Neck supple. No tracheal deviation present. No JVD present.   Cardiovascular: Normal rate, regular rhythm and normal heart sounds.   No murmur heard.  Pulmonary/Chest: Breath sounds normal. No respiratory distress. He has no wheezes. He has no rhonchi. He has no rales.   Abdominal: Soft. Bowel sounds are normal. He exhibits no distension. There is no abdominal tenderness. There is no rebound and no guarding.   Musculoskeletal: Normal range of motion. No tenderness or edema.   Neurological: He is alert and oriented to person, place, and time.   Dysarthria, right upper extremity ataxic, ataxic gait.   Skin: Skin is warm and dry. Capillary refill takes less than 2 seconds. No rash noted. No erythema.         ED Course   Procedures  Labs Reviewed   CBC W/ AUTO DIFFERENTIAL - Abnormal; Notable for the following components:       Result Value    RBC 4.41 (*)     Hemoglobin 13.4 (*)     All other components within normal limits   POCT GLUCOSE - Abnormal; Notable for the following components:    POCT Glucose 164 (*)     All other components within normal limits   COMPREHENSIVE METABOLIC PANEL   PROTIME-INR   TSH   LIPID PANEL   TROPONIN I   B-TYPE NATRIURETIC PEPTIDE   POCT GLUCOSE, HAND-HELD DEVICE          Imaging Results          X-Ray Chest AP Portable (In process)                CT Head Without Contrast (Final result)  Result time 10/02/20 19:02:32    Final result by Toni Bueno MD (10/02/20 19:02:32)                 Impression:      1. Punctate foci of low attenuation within the right basal ganglia, suggest sequela of prominent perivascular spaces however age-indeterminate infarcts cannot be excluded given that there are no previous examinations available for comparison.  Correlation with current symptomatology is recommended.  2. Sinus disease, noting chronic component involving the right maxillary  sinus.  3. Sequela of chronic microvascular ischemic change and senescent change.      Electronically signed by: Toni Bueno MD  Date:    10/02/2020  Time:    19:02             Narrative:    EXAMINATION:  CT HEAD WITHOUT CONTRAST    CLINICAL HISTORY:  Neuro deficit, acute, stroke suspected;    TECHNIQUE:  Low dose axial images were obtained through the head.  Coronal and sagittal reformations were also performed. Contrast was not administered.    COMPARISON:  None.    FINDINGS:  There is motion artifact.    There is generalized cerebral volume loss.  There is hypoattenuation in a periventricular fashion, likely sequela of chronic microvascular ischemic change.There are a few punctate foci of low attenuation in the region of the right basal ganglia, nonspecific.  There is no evidence of acute major vascular territory infarct, hemorrhage, or mass.  There is no hydrocephalus.  There are no abnormal extra-axial fluid collections.  There is opacification of the bilateral maxillary sinuses, complete on the right, near complete on the left.  There is osseous hypertrophy of the right maxillary sinus suggesting chronic component.  There is patchy opacification of the ethmoid sinuses, otherwise the visualized paranasal sinuses and mastoid air cells are clear, and there is no evidence of calvarial fracture.  The visualized soft tissues are unremarkable.                                                80-year-old male past medical history as noted above presents with ataxia, slurred speech, nausea.  Notable ataxia right upper extremity right lower extremity, ataxic gait, mild dysarthria.  Tele stroke consult it and discussed with patient as well risks benefits of tPA, patient agrees to receiving tPA at this time.  All contraindications reviews, risk discussed, patient given tPA, transferred to [facility] for post tPA care.                      Clinical Impression:       ICD-10-CM ICD-9-CM   1. Stroke  I63.9 434.91                                                Claude Izquierdo MD  10/02/20 1916

## 2020-10-03 NOTE — ASSESSMENT & PLAN NOTE
80 y.o. man with PMHx MI s/p stent x 2 (on Plavix, statin), skin CA (R facial/nose s/p recent resection), tobacco abuse who presented to [facility] [place] [facility] with gait imbalance, dysarthria, RUE ataxia and reduced sensation. LKN 1730. Telemedicine with Dr. Hall; tPA given. Pt was transferred to [facility] [place] for stat CTA Multiphase. Dr. Mark reviewed images as acquired. No large vessel occlusion; patient not a candidate for IR intervention. Admitted to [facility] for close monitoring/higher level of care.     Exam findings concerning for posterior circulation infarct. MRI Brain pending      Antithrombotics for secondary stroke prevention: Antiplatelets: None: Hold all Antithrombotics x 24 hours after IV t-PA administration  Statins for secondary stroke prevention and hyperlipidemia, if present:   Statins: Atorvastatin- 40 mg daily  Aggressive risk factor modification: Smoking, Diet, Exercise, CAD  Rehab efforts: The patient has been evaluated by a stroke team provider and the therapy needs have been fully considered based off the presenting complaints and exam findings. The following therapy evaluations are needed: PT evaluate and treat, OT evaluate and treat, SLP evaluate and treat  Diagnostics ordered/pending: HgbA1C to assess blood glucose levels, Lipid Profile to assess cholesterol levels, MRI head without contrast to assess brain parenchyma, TTE to assess cardiac function/status , TSH to assess thyroid function  VTE prophylaxis: Mechanical prophylaxis: Place SCDs  None: Reason for No Pharmacological VTE Prophylaxis: Holding x 24 hours s/p treatment with alteplase (tPA)  BP parameters: Infarct: Post tPA, SBP <180

## 2020-10-03 NOTE — HOSPITAL COURSE
Pt presented to the hospital for further evaluation of embolic stroke involving cerebellar artery. S/P TPA on 10/2 post gait imbalance, dysarthria, and RUE ataxia laong with decrease sensation. Neurologically significantly improved post TPA. Repeat MRI with small acute to subacute infarcts in R cerebellum. No associated hemorrhage or sing of mass effect. TTE with mild LA enlargement. Normal LV systolic function EF 65% 4.9 cm ascending aortic aneurysm and mild aortic regurgitation. Will need outpatient evaluation by CT surgery given the size of aneurysm. Pt evaluated by PT/OT with recs for homehealth OT. Stable for discharge home with hh and follow up with vascular neurology in 1 month and ct surgery in a week.

## 2020-10-03 NOTE — NURSING
Patient arrived to [facility] from [facility] by car  Type of stroke/diagnosis:  Embolic stroke of cerebellar artery    TPA start and end time- start 1917- end 2019    Thrombectomy not needed    Current symptoms: slurred speech    Skin assessment done: Yes  Wounds noted:none   DAYLIN Armband Applied: daylin passed  Patient Belongings on Admit: pt.  Pants with belt, shoes and socks, phone , phone, a book, wallet with debit card, target card, a lighter, 227.26 cents in wallet- one 100 dollar bill, 5 20s, one 10, one 5, 2 ones, 3 nickels, one dime, and a crystal. Put in safe    [facility] notified: Karley GAY notified

## 2020-10-03 NOTE — PLAN OF CARE
Clinical Swallow Evaluation completed.  Pt presents with a normal oropharyngeal swallow.  REC:  pt cont po intake with regular consistency diet with thin liquids, oral meds whole, aspiration precautions.  Cont ST per POC.    Zeinab Johnson CCC-SLP  10/3/2020      Problem: SLP Goal  Goal: SLP Goal  Description: Speech Language Pathology Goals  Goals expected to be met by 10/10/20    1.  Pt will complete Speech Language Cognitive evaluation to determine the need for additional goals.          Outcome: Ongoing, Progressing

## 2020-10-03 NOTE — ASSESSMENT & PLAN NOTE
80 year-old male s/t tPA from outside facility presenting with symptoms concerning for cerebellar infarct, tPA end time of 2019.    Vascular neurology consulted  CTA: no LVA, no acute findings  A1c 5.6  LDL 31.6   Admit to [facility] for q1hr neurochecks   SBP < 180  MRI today 2030  Low threshold to rescan for exam change  PT/OT/SLP   Patient likely step-down to vascular neurology this evening after MRI  Continue home ASA and Plavix if MRI stable  Continue Statin therapy

## 2020-10-03 NOTE — SUBJECTIVE & OBJECTIVE
Past Medical History:   Diagnosis Date    Cancer     MI (myocardial infarction)     pt reported     Past Surgical History:   Procedure Laterality Date    CARDIAC SURGERY        No current facility-administered medications on file prior to encounter.      Current Outpatient Medications on File Prior to Encounter   Medication Sig Dispense Refill    aspirin (ECOTRIN) 81 MG EC tablet Take 1 tablet (81 mg total) by mouth once daily. 30 tablet 1    atorvastatin (LIPITOR) 10 MG tablet Take 10 mg by mouth once daily.      clopidogreL (PLAVIX) 75 mg tablet Take 75 mg by mouth once daily.      metoprolol succinate (TOPROL-XL) 25 MG 24 hr tablet Take 25 mg by mouth once daily.        Allergies: Patient has no known allergies.    History reviewed. No pertinent family history.  Social History     Tobacco Use    Smoking status: Light Tobacco Smoker     Types: Cigarettes     Last attempt to quit: 2018     Years since quittin.4    Smokeless tobacco: Never Used    Tobacco comment: 2 cigarettes/day   Substance Use Topics    Alcohol use: No    Drug use: No     Review of Systems   Constitutional: Negative.  Negative for fatigue.   HENT: Negative for hearing loss.    Eyes: Negative for visual disturbance.   Respiratory: Negative for chest tightness and shortness of breath.    Cardiovascular: Negative for chest pain, palpitations and leg swelling.   Gastrointestinal: Positive for nausea. Negative for abdominal distention and vomiting.   Genitourinary: Negative.    Musculoskeletal: Positive for gait problem. Negative for neck pain and neck stiffness.   Skin: Negative.    Neurological: Positive for speech difficulty, weakness and numbness. Negative for syncope.   Psychiatric/Behavioral: Negative.      Objective:     Vitals:    Temp: 97.7 °F (36.5 °C)  Pulse: (!) 58  BP: (!) 160/87  MAP (mmHg): 121  Resp: 16  SpO2: 96 %  O2 Device (Oxygen Therapy): room air    Temp  Min: 97.6 °F (36.4 °C)  Max: 97.7 °F (36.5  °C)  Pulse  Min: 56  Max: 69  BP  Min: 149/73  Max: 180/86  MAP (mmHg)  Min: 103  Max: 126  Resp  Min: 15  Max: 21  SpO2  Min: 95 %  Max: 100 %    No intake/output data recorded.           Physical Exam  Constitutional:       General: He is not in acute distress.     Appearance: Normal appearance. He is not ill-appearing.   HENT:      Head: Normocephalic and atraumatic.   Eyes:      Extraocular Movements: Extraocular movements intact.      Conjunctiva/sclera: Conjunctivae normal.      Pupils: Pupils are equal, round, and reactive to light.   Neck:      Musculoskeletal: Normal range of motion and neck supple.   Cardiovascular:      Rate and Rhythm: Normal rate and regular rhythm.      Pulses: Normal pulses.   Pulmonary:      Effort: Pulmonary effort is normal.      Breath sounds: Normal breath sounds.   Abdominal:      Palpations: Abdomen is soft.   Musculoskeletal: Normal range of motion.   Skin:     General: Skin is warm and dry.      Capillary Refill: Capillary refill takes 2 to 3 seconds.   Neurological:      Mental Status: He is alert.      Coordination: Finger-Nose-Finger Test and Heel to Shin Test normal.      Comments:   GCS 15 - patient alert   PERRL 3MM  EOM: Intact   NIH: 2 Mild dysarthria, mild aphasia  LUE: 5/5  RUE: 5/5  LLE: 5/5  RLE: 5/5    Sensation intact: facial, BUEs, LUEs       Today I personally reviewed pertinent medications, lines/drains/airways, imaging, cardiology results, laboratory results, microbiology results, notably:

## 2020-10-03 NOTE — PROGRESS NOTES
[facility]  Vascular Neurology  Comprehensive Stroke Center  Progress Note    Assessment/Plan:     * Embolic stroke involving cerebellar artery  80 y.o. man with PMHx MI s/p stent x 2 (on Plavix, statin), skin CA (R facial/nose s/p recent resection), tobacco abuse who presented to [facility] [place] [facility] with gait imbalance, dysarthria, RUE ataxia and reduced sensation. LKN 1730. Telemedicine with Dr. Hall; tPA given. Pt was transferred to [facility] for stat CTA Multiphase. Dr. Mark reviewed images as acquired. No large vessel occlusion; patient not a candidate for IR intervention. Admitted to [facility] for close monitoring/higher level of care.     Exam findings concerning for posterior circulation infarct. MRI Brain pending      Antithrombotics for secondary stroke prevention: Antiplatelets: None: Hold all Antithrombotics x 24 hours after IV t-PA administration  Statins for secondary stroke prevention and hyperlipidemia, if present:   Statins: Atorvastatin- 40 mg daily  Aggressive risk factor modification: Smoking, Diet, Exercise, CAD  Rehab efforts: The patient has been evaluated by a stroke team provider and the therapy needs have been fully considered based off the presenting complaints and exam findings. The following therapy evaluations are needed: PT evaluate and treat, OT evaluate and treat, SLP evaluate and treat  Diagnostics ordered/pending: MRI head without contrast to assess brain parenchyma, TTE to assess cardiac function/status   VTE prophylaxis: Mechanical prophylaxis: Place SCDs  None: Reason for No Pharmacological VTE Prophylaxis: Holding x 24 hours s/p treatment with alteplase (tPA)  BP parameters: Infarct: Post tPA, SBP <180    Ataxia  2/2 stroke - Initial presentation of gait instability, which reportedly had been present but mild for about the last week, then acutely worsened day of presentation. Significantly improved post PTA on 10/2.  PT, OT eval & treat    Tobacco abuse  Stroke risk factor -  pt states he smokes ~2 cigarettes per day and has for many years  Nicotine patch PRN  Counseled pt on importance of cessation for secondary stroke prevention. He conveys plans to quit cold turkey    CAD (coronary artery disease)  Stroke risk factor  TTE pending  Pt takes Plavix, statin, beta blocker at home    Status post administration of tPA (rtPA) in a different facility within the last 24 hours prior to admission to current facility  Admitted to Neuro ICU for 24-hr post-tPA monitoring         Pt presented to the hospital for further evaluation of embolic stroke involving cerebellar artery. S/P TPA on 10/2 post gait imbalance, dysarthria, and RUE ataxia laong with decrease sensation. Neurologically significantly improved post TPA. Repeat MRI pending    STROKE DOCUMENTATION   Acute Stroke Times   Last Known Normal Date: 10/02/20  Last Known Normal Time: 1730  Symptom Onset Date: 10/02/20  Symptom Onset Time: 1730  Stroke Team Called Date: 10/02/20  Stroke Team Called Time: 2020  Stroke Team Arrival Date: 10/02/20  Stroke Team Arrival Time: 2028  CT Interpretation Time: 2044  Decision to Treat Time for Alteplase: 1902    NIH Scale:      Current NIH Stroke Score Values      Most Recent Value   Interval  baseline   1a. Level of Consciousness  0-->Alert, keenly responsive   1b. LOC Questions  0-->Answers both questions correctly   1c. LOC Commands  0-->Performs both tasks correctly   2. Best Gaze  0-->Normal   3. Visual  0-->No visual loss   4. Facial Palsy  0-->Normal symmetrical movements   5a. Motor Arm, Left  0-->No drift, limb holds 90 (or 45) degrees for full 10 secs   5b. Motor Arm, Right  0-->No drift, limb holds 90 (or 45) degrees for full 10 secs   6a. Motor Leg, Left  0-->No drift, leg holds 30 degree position for full 5 secs   6b. Motor Leg, Right  0-->No drift, leg holds 30 degree position for full 5 secs   7. Limb Ataxia  1-->Present in one limb   8. Sensory  0-->Normal, no sensory loss   9. Best  Language  0-->No aphasia, normal   10. Dysarthria  1-->Mild-to-moderate dysarthria, patient slurs at least some words and, at worst, can be understood with some difficulty   11. Extinction and Inattention (formerly Neglect)  0-->No abnormality   Total (NIH Stroke Scale)  2            Modified McCracken Score: 0  Andrea Coma Scale:    ABCD2 Score:    [id number] Score:   HAS -BLED Score:   ICH Score:   Hunt & Betancourt Classification:      Hemorrhagic change of an Ischemic Stroke: Does this patient have an ischemic stroke with hemorrhagic changes? No     Neurologic Chief Complaint: R sided ataxia and decrease sensation.     Subjective:     Interval History: NAEON. Pt eating well. significant improvement in neurological examination. Mild R sided cerebellar dysfunction on examination. Walks without assistance.  Repeat MRI pending.    HPI, Past Medical, Family, and Social History remains the same as documented in the initial encounter.     Review of Systems   Constitutional: Negative for activity change, chills, diaphoresis and fever.   HENT: Negative for drooling and trouble swallowing.    Respiratory: Negative for cough, chest tightness and shortness of breath.    Gastrointestinal: Negative for nausea and vomiting.   Genitourinary: Negative for frequency and hematuria.   Skin: Negative for pallor and rash.   Neurological: Negative for dizziness, tremors, seizures, facial asymmetry, speech difficulty, weakness, light-headedness, numbness and headaches.   Psychiatric/Behavioral: Negative for agitation, behavioral problems, confusion and decreased concentration.     Scheduled Meds:   atorvastatin  40 mg Oral Daily    senna-docusate 8.6-50 mg  1 tablet Oral BID     Continuous Infusions:  PRN Meds:acetaminophen, labetalol, ondansetron    Objective:     Vital Signs (Most Recent):  Temp: 98.1 °F (36.7 °C) (10/03/20 0715)  Pulse: (!) 55 (10/03/20 1415)  Resp: 17 (10/03/20 1415)  BP: 135/76 (10/03/20 1415)  SpO2: 96 % (10/03/20  1415)  BP Location: Right arm    Vital Signs Range (Last 24H):  Temp:  [97.6 °F (36.4 °C)-98.4 °F (36.9 °C)]   Pulse:  [55-69]   Resp:  [15-29]   BP: (135-180)/(73-92)   SpO2:  [95 %-100 %]   BP Location: Right arm    Physical Exam  Vitals signs and nursing note reviewed.   Constitutional:       General: He is not in acute distress.     Appearance: Normal appearance. He is well-developed. He is not ill-appearing.   HENT:      Head: Normocephalic and atraumatic.      Mouth/Throat:      Mouth: Mucous membranes are moist.   Eyes:      General: No scleral icterus.     Extraocular Movements: Extraocular movements intact.      Pupils: Pupils are equal, round, and reactive to light.      Comments: L sided nystagmus    Neck:      Musculoskeletal: Normal range of motion and neck supple.      Vascular: No JVD.      Trachea: No tracheal deviation.   Cardiovascular:      Rate and Rhythm: Normal rate and regular rhythm.      Heart sounds: Normal heart sounds. No murmur. No friction rub. No gallop.    Pulmonary:      Effort: Pulmonary effort is normal. No respiratory distress.      Breath sounds: Normal breath sounds. No wheezing or rales.   Chest:      Chest wall: No tenderness.   Abdominal:      General: Bowel sounds are normal. There is no distension.      Palpations: Abdomen is soft.      Tenderness: There is no abdominal tenderness. There is no guarding or rebound.   Musculoskeletal: Normal range of motion.   Lymphadenopathy:      Cervical: No cervical adenopathy.   Skin:     General: Skin is warm and dry.      Capillary Refill: Capillary refill takes less than 2 seconds.      Findings: No rash.   Neurological:      General: No focal deficit present.      Mental Status: He is alert and oriented to person, place, and time.      Motor: No weakness.         Neurological Exam:   LOC: alert  Attention Span: Good   Language: No aphasia  Articulation: No dysarthria  Orientation: Person, Place, Time   Visual Fields: Full  EOM (CN  III, IV, VI): Full/intact  Pupils (CN II, III): PERRL  Facial Sensation (CN V): Normal  Facial Movement (CN VII): Symmetric facial expression    Motor: Arm left  Normal 5/5  Leg left  Normal 5/5  Arm right  Normal 5/5  Leg right Normal 5/5  Cebellar: Upper Extremity Appendicular Ataxia (Finger Nose Finger)  Right  Sensation: Intact to light touch, temperature and vibration    Laboratory:  CBC:   Recent Labs   Lab 10/03/20  0312   WBC 8.27   RBC 4.19*   HGB 12.8*   HCT 39.8*      MCV 95   MCH 30.5   MCHC 32.2     Lipid Panel:   Recent Labs   Lab 10/02/20  2231   CHOL 82*   LDLCALC 31.2*   HDL 39*   TRIG 59     Hgb A1C:   Recent Labs   Lab 10/02/20  2231   HGBA1C 5.6     TSH:   Recent Labs   Lab 10/02/20  1903   TSH 3.089       Diagnostic Results     Brain Imaging   CTH WO 10/2) Punctate foci of low attenuation within the right basal ganglia, suggest sequela of prominent perivascular spaces however age-indeterminate infarcts cannot be excluded given that there are no previous examinations available for comparison. Sinus disease, noting chronic component involving the right maxillary sinus. Sequela of chronic microvascular ischemic change and senescent change.  MRI Brain w wo contrast- pending  Vessel Imaging   CTA 10/2) No acute abnormality on CTA evaluation of the head/neck.  No high-grade stenosis, occlusion, or aneurysm.   Cardiac Imaging   TTE- Pending      Christine Alonso MD  Comprehensive Stroke Center  Department of Vascular Neurology   [facility]

## 2020-10-03 NOTE — PLAN OF CARE
Problem: Physical Therapy Goal  Goal: Physical Therapy Goal  Description: PT goals to be met by: 10/13/20    Patient will perform sit <> stand transitions independently.  Patient will perform transfers from bed <> chair or BSC independently.  Patient will ambulate 200 feet with supervision and no device.  Patient will ascend/descend 1 flight of steps using R handrails with supervision.  Outcome: Ongoing, Progressing     Initial evaluation completed and plan of care established.    Claudia Remy, PT, DPT  10/3/2020

## 2020-10-03 NOTE — CONSULTS
[facility]  Vascular Neurology  Comprehensive Stroke Center  Consult Note    Consults  Assessment/Plan:     Patient is a 80 y.o. year old male with:    * Embolic stroke involving cerebellar artery  80 y.o. man with PMHx MI s/p stent x 2 (on Plavix, statin), skin CA (R facial/nose s/p recent resection), tobacco abuse who presented to [facility] with gait imbalance, dysarthria, RUE ataxia and reduced sensation. LKN 1730. Telemedicine with Dr. Hall; tPA given. Pt was transferred to [facility] for stat CTA Multiphase. Dr. Mark reviewed images as acquired. No large vessel occlusion; patient not a candidate for IR intervention. Admitted to [facility] for close monitoring/higher level of care.     Exam findings concerning for posterior circulation infarct. MRI Brain pending      Antithrombotics for secondary stroke prevention: Antiplatelets: None: Hold all Antithrombotics x 24 hours after IV t-PA administration  Statins for secondary stroke prevention and hyperlipidemia, if present:   Statins: Atorvastatin- 40 mg daily  Aggressive risk factor modification: Smoking, Diet, Exercise, CAD  Rehab efforts: The patient has been evaluated by a stroke team provider and the therapy needs have been fully considered based off the presenting complaints and exam findings. The following therapy evaluations are needed: PT evaluate and treat, OT evaluate and treat, SLP evaluate and treat  Diagnostics ordered/pending: HgbA1C to assess blood glucose levels, Lipid Profile to assess cholesterol levels, MRI head without contrast to assess brain parenchyma, TTE to assess cardiac function/status , TSH to assess thyroid function  VTE prophylaxis: Mechanical prophylaxis: Place SCDs  None: Reason for No Pharmacological VTE Prophylaxis: Holding x 24 hours s/p treatment with alteplase (tPA)  BP parameters: Infarct: Post tPA, SBP <180    Ataxia  2/2 stroke - Initial presentation of gait instability, which reportedly had been present but mild  for about the last week, then acutely worsened day of presentation. Mild RUE dysmetria on exam  PT, OT eval & treat    Status post administration of tPA (rtPA) in a different facility within the last 24 hours prior to admission to current facility  Admitted to Neuro ICU for 24-hr post-tPA monitoring    CAD (coronary artery disease)  Stroke risk factor  TTE pending  Pt takes Plavix, statin, beta blocker at home    Tobacco abuse  Stroke risk factor - pt states he smokes ~2 cigarettes per day and has for many years  Nicotine patch PRN  Counseled pt on importance of cessation for secondary stroke prevention. He conveys plans to quit cold turkey        STROKE DOCUMENTATION     Acute Stroke Times   Last Known Normal Date: 10/02/20  Last Known Normal Time: 1730  Symptom Onset Date: 10/02/20  Symptom Onset Time: 1730  Stroke Team Called Date: 10/02/20  Stroke Team Called Time: 2020  Stroke Team Arrival Date: 10/02/20  Stroke Team Arrival Time: 2028  CT Interpretation Time: 2044  Decision to Treat Time for Alteplase: 1902    NIH Scale:  1a. Level of Consciousness: 0-->Alert, keenly responsive  1b. LOC Questions: 0-->Answers both questions correctly  1c. LOC Commands: 0-->Performs both tasks correctly  2. Best Gaze: 0-->Normal  3. Visual: 0-->No visual loss  4. Facial Palsy: 0-->Normal symmetrical movements  5a. Motor Arm, Left: 0-->No drift, limb holds 90 (or 45) degrees for full 10 secs  5b. Motor Arm, Right: 0-->No drift, limb holds 90 (or 45) degrees for full 10 secs  6a. Motor Leg, Left: 0-->No drift, leg holds 30 degree position for full 5 secs  6b. Motor Leg, Right: 0-->No drift, leg holds 30 degree position for full 5 secs  7. Limb Ataxia: 1-->Present in one limb  8. Sensory: 1-->Mild-to-moderate sensory loss, patient feels pinprick is less sharp or is dull on the affected side, or there is a loss of superficial pain with pinprick, but patient is aware of being touched  9. Best Language: 0-->No aphasia, normal  10.  Dysarthria: 1-->Mild-to-moderate dysarthria, patient slurs at least some words and, at worst, can be understood with some difficulty  11. Extinction and Inattention (formerly Neglect): 0-->No abnormality  Total (NIH Stroke Scale): 3    Modified Tayler Score: 0  [place] Coma Scale:    ABCD2 Score:    [id number] Score:   HAS -BLED Score:   ICH Score:   Hunt & Betancourt Classification:       Thrombolysis Candidate? Yes, given prior to arrival at outside hospital    Delays to Thrombolysis?  No    Interventional Revascularization Candidate?   Is the patient eligible for mechanical endovascular reperfusion ([facility])?  No; No large vessel occlusion      Hemorrhagic change of an Ischemic Stroke: Does this patient have an ischemic stroke with hemorrhagic changes? No     Subjective:     History of Present Illness:  Kwan Ramos is a 80 y.o. male with PMHx MI s/p stent x 2 (on Plavix, statin), skin CA (R facial/nose s/p recent resection), tobacco abuse who presented as a transfer from [facility] for evaluation of dizziness and ataxia. Pt was LKN 1730 when he acutely developed unsteady gait, dizziness, and dysarthria. CTH at OSH demonstrated no acute abnormalities. Telestroke consult performed by Dr. Hall and tPA administered. Transferred to [facility] for further evaluation/possible intervention.  On arrival, pt alert, oriented, able to give a history, and moving all extremities against gravity with some subjective R hand numbness, RUE dysmetria, and mildly slurred speech. He states his symptoms seem to be improving since tPA. Taken for stat CTA Multiphase.    Patient later reports that he was in his usual health prior to onset of symptoms when he first noticed that he didn't seem himself, felt apathetic; reportedly took a baby aspirin. Pt states that his gait had seemed subtley off for about the last week or so then was notably significantly worse today where he felt off balance and as though he was leaning to one side.  He also c/o dizziness, N/V, and difficulty using his R hand at that time, but denies any numbness/tingling, HA, or vision changes.  Pt denies personal hx stroke, blood clots, seizure, cardiac arrhythmia; takes Plavix daily at home. He lives alone and performs all ADLs independently. Pt smokes ~2 cigarettes per day, states he has occasionally smoked marijuana for glaucoma (none recently in months); denies alcohol use.        Past Medical History:   Diagnosis Date    Cancer     MI (myocardial infarction)     pt reported     Past Surgical History:   Procedure Laterality Date    CARDIAC SURGERY       History reviewed. No pertinent family history.  Social History     Tobacco Use    Smoking status: Light Tobacco Smoker     Types: Cigarettes     Last attempt to quit: 2018     Years since quittin.4    Smokeless tobacco: Never Used    Tobacco comment: 2 cigarettes/day   Substance Use Topics    Alcohol use: No    Drug use: No     Review of patient's allergies indicates:  No Known Allergies    Medications: I have reviewed the current medication administration record.    (Not in a hospital admission)      Review of Systems   Constitutional: Positive for activity change. Negative for fatigue.   HENT: Negative for facial swelling and nosebleeds.    Eyes: Negative for discharge and visual disturbance.   Respiratory: Negative for apnea and choking.    Cardiovascular: Negative for chest pain and leg swelling.   Gastrointestinal: Positive for nausea and vomiting.   Musculoskeletal: Positive for gait problem. Negative for joint swelling.   Skin: Negative for rash and wound.   Neurological: Positive for speech difficulty and numbness. Negative for facial asymmetry and weakness.   Psychiatric/Behavioral: Negative for confusion and decreased concentration.     Objective:     Vital Signs (Most Recent):  Temp: 97.7 °F (36.5 °C) (10/02/20 2058)  Pulse: 64 (10/02/20 2119)  Resp: 20 (10/02/20 2113)  BP: (!) 164/74 (10/02/20  2113)  SpO2: 99 % (10/02/20 2113)    Vital Signs Range (Last 24H):  Temp:  [97.6 °F (36.4 °C)-97.7 °F (36.5 °C)]   Pulse:  [56-64]   Resp:  [15-21]   BP: (149-180)/(73-89)   SpO2:  [96 %-100 %]     Physical Exam  Constitutional:       General: He is not in acute distress.  HENT:      Head: Normocephalic and atraumatic.   Eyes:      Extraocular Movements: Extraocular movements intact.      Conjunctiva/sclera: Conjunctivae normal.   Cardiovascular:      Rate and Rhythm: Normal rate.   Pulmonary:      Effort: Pulmonary effort is normal. No respiratory distress.   Musculoskeletal: Normal range of motion.         General: No signs of injury.   Feet:      Right foot:      Skin integrity: Dry skin present.      Left foot:      Skin integrity: Dry skin present.   Skin:     General: Skin is warm and dry.   Neurological:      Mental Status: He is alert and oriented to person, place, and time.      Sensory: Sensory deficit present.      Motor: No weakness.   Psychiatric:         Mood and Affect: Mood normal.         Behavior: Behavior normal.         Neurological Exam:   LOC: alert  Attention Span: Good   Language: No aphasia  Articulation: Dysarthria  Orientation: Person, Place, Time   Visual Fields: Full  EOM (CN III, IV, VI): Full/intact  Facial Movement (CN VII): Symmetric facial expression    Motor: Arm left  Normal 5/5  Leg left  Normal 5/5  Arm right  Normal 5/5  Leg right Normal 5/5  Cebellar: Upper Extremity Appendicular Ataxia (Finger Nose Finger)  Right  Sensation: Intact to light touch, temp; reported subjective numbness of R hand  Tone: Normal tone throughout      Laboratory:  CMP:   Recent Labs   Lab 10/02/20  1903   CALCIUM 9.6   ALBUMIN 3.6   PROT 6.4   *   K 3.3*   CO2 28      BUN 19   CREATININE 0.9   ALKPHOS 62   ALT 16   AST 19   BILITOT 0.6     CBC:   Recent Labs   Lab 10/02/20  1903   WBC 8.78   RBC 4.41*   HGB 13.4*   HCT 40.9      MCV 93   MCH 30.4   MCHC 32.8     Lipid Panel:    Recent Labs   Lab 10/02/20  1903   CHOL 88*   LDLCALC 39.8*   HDL 34*   TRIG 71     Coagulation:   Recent Labs   Lab 10/02/20  1903   INR 1.0     Hgb A1C: No results for input(s): HGBA1C in the last 168 hours.  TSH:   Recent Labs   Lab 10/02/20  1903   TSH 3.089       Diagnostic Results:      Brain/Vessel imaging:    CTA Stroke Multiphase 10/2/20  Stable appearance of noncontrast CT head.  Additional evaluation, as clinically warranted.  No acute abnormality on CTA evaluation of the head/neck.  No high-grade stenosis, occlusion, or aneurysm.      Che Garcia PA-C  Comprehensive Stroke Center  Department of Vascular Neurology   [facility]

## 2020-10-03 NOTE — PT/OT/SLP EVAL
Occupational Therapy   Evaluation    Name: Kwan Ramos  MRN: [id number]  Admitting Diagnosis:  Embolic stroke involving cerebellar artery      Recommendations:     Discharge Recommendations: home health OT  Discharge Equipment Recommendations:  none  Barriers to discharge:  Decreased caregiver support, Inaccessible home environment    Assessment:     Kwan Ramos is a 80 y.o. male with a medical diagnosis of Embolic stroke involving cerebellar artery.  He presents with decreased independence with ADL's & mildly decreased coordination. Performance deficits affecting function: weakness, impaired endurance, impaired self care skills, impaired functional mobilty, impaired balance, decreased coordination, decreased safety awareness.      Rehab Prognosis: Good; patient would benefit from acute skilled OT services to address these deficits and reach maximum level of function.       Plan:     Patient to be seen 3 x/week to address the above listed problems via self-care/home management, therapeutic activities, therapeutic exercises, neuromuscular re-education  · Plan of Care Expires: 11/02/20  · Plan of Care Reviewed with: patient    Subjective   Pt reported that his coordination was off.  Chief Complaint: none  Patient/Family Comments/goals: to get better    Occupational Profile:  Living Environment: Pt resides alone in 2nd floor apartment with ~ 15 steps 2 rails to enter.  Pt was independent with ADL's, ambulation, & IADL's.  Pt has a bathtub for bathing.  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  Pt enjoys fishing, golfing, meditating, & jania chi.  Equipment Used at Home:  none  Assistance upon Discharge: none    Pain/Comfort:  · Pain Rating 1: 0/10  · Pain Rating Post-Intervention 1: 0/10    Patients cultural, spiritual, Restoration conflicts given the current situation: no    Objective:     Communicated with: RN prior to session.  Patient found supine with  telemetry, pulse ox (continuous), blood pressure cuff upon OT entry to room. No family present.    General Precautions: Standard, fall, aspiration   Orthopedic Precautions:N/A   Braces: N/A     Occupational Performance:    Bed Mobility:    · Patient completed Supine to Sit with stand by assistance  · Patient completed Sit to Supine with stand by assistance    Functional Mobility/Transfers:  · Patient completed Sit <> Stand Transfer with stand by assistance  with  no assistive device   · Functional Mobility: SBA with functional mobility forward & back from EOB x 2 trials ~ 6 steps forward & back    Activities of Daily Living:  · Upper Body Dressing: stand by assistance seated EOB  · Lower Body Dressing: stand by assistance doffing & donning socks seated EOB    Cognitive/Visual Perceptual:  Cognitive/Psychosocial Skills:     -       Oriented to: Person, Place, Time and Situation   -       Follows Commands/attention:Follows multistep  commands  -       Communication: clear/fluent  -       Safety awareness/insight to disability: impaired     Physical Exam:  Sensation:    -       Intact  Dominant hand:    -       left  Upper Extremity Range of Motion:     -       Right Upper Extremity: WFL  -       Left Upper Extremity: WFL  Upper Extremity Strength:    -       Right Upper Extremity: WFL  -       Left Upper Extremity: WFL   Strength:    -       Right Upper Extremity: WFL  -       Left Upper Extremity: WFL  Fine Motor Coordination:    -       Impaired  Right hand, finger to nose      AMPAC 6 Click ADL:  AMPAC Total Score: 19    Treatment & Education:  Provided education regarding role of OT, POC, discharge recommendations & safety.  Provided extensive education regarding signs of stroke, safety, need for (A) with all mobility at this time, & importance of having (A) for mobility due to tPA restrictions & risk for injury if were to fall.  Pt verbalized understanding. Pt had no further questions & when asked whether  there were any concerns pt reported none.    Education:    Patient left supine with all lines intact, call button in reach, bed alarm on and RN notified    GOALS:   Multidisciplinary Problems     Occupational Therapy Goals        Problem: Occupational Therapy Goal    Goal Priority Disciplines Outcome Interventions   Occupational Therapy Goal     OT, PT/OT Ongoing, Progressing    Description: Goals to be met by: 10/13     Patient will increase functional independence with ADLs by performing:    UE Dressing with Modified [place].  LE Dressing with Modified independence.  Grooming while standing with Modified [place].  Toileting from toilet with Modified [place] for hygiene and clothing management.   Supine to sit with Modified [place].  Step transfer with Modified [place]                     History:     Past Medical History:   Diagnosis Date    Cancer     HTN (hypertension) 10/3/2020    MI (myocardial infarction) 2013    pt reported       Past Surgical History:   Procedure Laterality Date    CARDIAC SURGERY         Time Tracking:     OT Date of Treatment: 10/03/20  OT Start Time: 1152  OT Stop Time: 1219  OT Total Time (min): 27 min    Billable Minutes:Evaluation 17  Therapeutic Activity 10    NAS Reese  10/3/2020

## 2020-10-03 NOTE — ED NOTES
TPA Administration   Administered by [facility] ED.   Bolus dose of 7 mg given at 1913.   Infusion of 61mg started at 1917.   Infusion completed at 2019.   Last Seen Normal: 10/2/20 at 1730   Symptoms Onset 10/2/2020 at 1730

## 2020-10-03 NOTE — CONSULTS
[facility]  Vascular Neurology  Comprehensive Stroke Center  Tele-Consultation Note      Inpatient consult to Telemedicine-Stroke  Consult performed by: Jose M Hall MD  Consult ordered by: Dawson Izquierdo MD          Consulting Provider: DAWSON IZQUIERDO.  Current Providers  No providers found    Patient Location:  [facility] EMERGENCY DEPARTMENT Emergency Department  Spoke hospital nurse at bedside with patient assisting consultant.     Patient information was obtained from patient.         Assessment/Plan:     STROKE DOCUMENTATION     Acute Stroke Times:   Acute Stroke Times   Last Known Normal Time: 1730  Symptom Onset Date: 10/02/20  Symptom Onset Time: 1730  Stroke Team Called Time: 1840  Stroke Team Arrival Time: 1852  CT Interpretation Time: 1852  Decision to Treat Time for Alteplase: 1902    NIH Scale:  1a. Level of Consciousness: 0-->Alert, keenly responsive  1b. LOC Questions: 0-->Answers both questions correctly  1c. LOC Commands: 0-->Performs both tasks correctly  2. Best Gaze: 0-->Normal  3. Visual: 0-->No visual loss  4. Facial Palsy: 0-->Normal symmetrical movements  5a. Motor Arm, Left: 0-->No drift, limb holds 90 (or 45) degrees for full 10 secs  5b. Motor Arm, Right: 0-->No drift, limb holds 90 (or 45) degrees for full 10 secs  6a. Motor Leg, Left: 0-->No drift, leg holds 30 degree position for full 5 secs  6b. Motor Leg, Right: 0-->No drift, leg holds 30 degree position for full 5 secs  7. Limb Ataxia: 1-->Present in one limb  8. Sensory: 1-->Mild-to-moderate sensory loss, patient feels pinprick is less sharp or is dull on the affected side, or there is a loss of superficial pain with pinprick, but patient is aware of being touched  9. Best Language: 0-->No aphasia, normal  10. Dysarthria: 1-->Mild-to-moderate dysarthria, patient slurs at least some words and, at worst, can be understood with some difficulty  11. Extinction and Inattention (formerly  "Neglect): 0-->No abnormality  Total (NIH Stroke Scale): 3     Modified [place]    Aldrich Coma Scale:    ABCD2 Score:    [id number] Score:   HAS -BLED Score:   ICH Score:   Hunt & Betancourt Classification:       Diagnoses:   Embolic stroke involving cerebellar artery  Dysarthria, imbalance, R UE ataxia, reduced sensation - findings all concerning for posterior circulation infarct, onset within the hour    Antithrombotics for secondary stroke prevention: Antiplatelets: None: Hold all Antithrombotics x 24 hours after IV t-PA administration    Statins for secondary stroke prevention and hyperlipidemia, if present:   Statins: Atorvastatin- 40 mg daily    Aggressive risk factor modification: CAD     Rehab efforts: The patient has been evaluated by a stroke team provider and the therapy needs have been fully considered based off the presenting complaints and exam findings. The following therapy evaluations are needed: PT evaluate and treat, OT evaluate and treat, SLP evaluate and treat    Diagnostics ordered/pending: CTA Head to assess vasculature , CTA Neck/Arch to assess vasculature, HgbA1C to assess blood glucose levels, Lipid Profile to assess cholesterol levels, MRI head without contrast to assess brain parenchyma, TTE to assess cardiac function/status     VTE prophylaxis: None: Reason for No Pharmacological VTE Prophylaxis: Holding x 24 hours s/p treatment with alteplase (tPA)    BP parameters: Infarct: Post tPA, SBP <180          Blood pressure (!) 180/86, pulse (!) 56, temperature 97.6 °F (36.4 °C), temperature source Oral, resp. rate 20, height 5' 9" (1.753 m), SpO2 96 %.  Alteplase Eligible?: Yes  Alteplase Recommendation:   Alteplase Total Dose:   Total dose: Alteplase 0.9mg/kg (max dose:90mg)                      ** based on acquired weight from facility   Bolus Dose:   10% of total Alteplase dose given intravenously over 1 minute   Continuous Infusion Dose:   Remaining 90% of total Alteplase dose infused " intravenously over 60 minutes    **infusion must start at the same time as the bolus dose     Additional Recommendations:   1. Neurological assessment and vital signs (except temperature) every 15 minutes during Altaplase infusion.  2. Frequency of BP assessments may need to be increased if systolic BP stays >= 180 mm Hg or diastolic BP stays >= 105 mm Hg. Administer antihypertensive meds as ordered  3. Continue to monitor and control blood pressure and monitor for neurological deterioration every 15 minutes for the first hour after the infusion is stopped. Then every 30 minutes for the next 6 hours. Perform hourly monitoring from the 8th post-infusion hour until 24 hours post-infusion.  4. Temperature every 4 hours or as required.  5. Follow hospital protocol for further orders re: post tPA infusion patient management.  6. No antithrombotics or anticoagulants (including but not limited to: heparin, warfarin, aspirin, clopidigrel, or dipyridamole) for 24 hours, then start antithrombotics as ordered by treating physician    Adapted from the American Heart Association/American Stroke Association (AHA/ASA) and American Association of Neuroscience Nurses (AANN) Guidelines.   Possible Interventional Revascularization Candidate? Unlikely LVO, CTA upon arrival to [facility]    Disposition Recommendation: transfer to [facility] by  ground  stat    Subjective:     History of Present Illness:  80M w/ acute onset of dizziness, nausea and difficulty walking and having some slurred speech. The aforementioned symptoms have never happened before. There are no identified triggers or modifying factors. There have been no recurrent events. There are no other associated symptoms.          Woke up with symptoms?: no    Recent bleeding noted: no  Does the patient take any Blood Thinners? no  Medications: Antiplatelets:  aspirin and clopidogrel/Plavix      Past Medical History:   Past Medical History:   Diagnosis Date    Cancer   "   MI (myocardial infarction) 2013    pt reported       Past Surgical History: no major surgeries within the last 2 weeks    Family History: no relevant history    Social History: no smoking, no drinking, no drugs    Allergies: No Known Allergies No relevant allergies    Review of Systems   Constitutional: Negative for appetite change, chills and fever.   HENT: Negative for congestion and sore throat.    Eyes: Negative for discharge and itching.   Respiratory: Negative for apnea and shortness of breath.    Cardiovascular: Negative for chest pain and palpitations.   Gastrointestinal: Negative for abdominal pain and anal bleeding.   Endocrine: Negative for cold intolerance and polydipsia.   Genitourinary: Negative for dysuria and hematuria.   Musculoskeletal: Negative for joint swelling and myalgias.   Skin: Negative for color change and rash.   Neurological: Negative for tremors.   Psychiatric/Behavioral: Negative for hallucinations and self-injury.     Objective:   Vitals: Blood pressure (!) 180/86, pulse (!) 56, temperature 97.6 °F (36.4 °C), temperature source Oral, resp. rate 20, height 5' 9" (1.753 m), SpO2 96 %.     CT READ: Yes  No hemmorhage. No mass effect. No early infarct signs.     Physical Exam  Constitutional:       General: He is not in acute distress.  HENT:      Head: Atraumatic.      Right Ear: External ear normal.      Left Ear: External ear normal.   Eyes:      General: No scleral icterus.     Conjunctiva/sclera: Conjunctivae normal.   Neck:      Musculoskeletal: Normal range of motion.   Pulmonary:      Effort: Pulmonary effort is normal.   Abdominal:      General: There is no distension.      Tenderness: There is no guarding.   Musculoskeletal: Normal range of motion.         General: No deformity.   Skin:     General: Skin is warm and dry.   Neurological:      Mental Status: He is alert.               Recommended the emergency room physician to have a brief discussion with the patient and/or " family if available regarding the risks and benefits of treatment, and to briefly document the occurrence of that discussion in his clinical encounter note.     The attending portion of this evaluation, treatment, and documentation was performed per Jose M Hall MD via audiovisual.    Billing code:  (time dependent stroke, complex case, unstable patient, hemorrhages, any intervention, some mimics)    · This patient has a very critical neurological condition/illness, with very high morbidity and mortality.  · There is a very high probability for acute neurological change leading to clinical and possibly life-threatening deterioration requiring highest level of physician preparedness for urgent intervention.  · There is possibility that this condition will require treatment with high risk medications as quickly as possible.  · There is also a possibility that the patient may benefit from further, more advance complex therapies (e.g. endovascular therapy) that will require prompt diagnosis and care.  · Care was coordinated with other physicians involved in the patient's care.  · Radiologic studies and laboratory data were reviewed and interpreted, and plan of care was re-assessed based on the results.  · Diagnosis, treatment options and prognosis may have been discussed with the patient and/or family members or caregiver.  · Further advanced medical management and further evaluation is warranted for his care.      In your opinion, this was a: Tier 1 Van Negative    Consult End Time: 7:06 PM     Jose M Hall MD  [facility]  Vascular Neurology   [facility]

## 2020-10-03 NOTE — ASSESSMENT & PLAN NOTE
80 year-old male s/t tPA from outside facility presenting with symptoms concerning for cerebellar infarct, tPA end time of 2019.    Vascular neurology consulted  CTA: no LVA, no acute findings  HgbA1C, lipid panel pending   Admit to [facility] for q1hr neurochecks   SBP < 180  MRI tomorrow 2030  Low threshold to rescan for exam change  PT/OT/SLP

## 2020-10-03 NOTE — PROGRESS NOTES
[facility] Care Plan    POC reviewed with Kwan Ramos and family at 1400. Pt verbalized understanding. Questions and concerns addressed. No acute events today. MRI completed. Pt up with PT/OT. Pt progressing toward goals. Will continue to monitor. See below and flowsheets for full assessment and VS info.       Neuro:  Andrea Coma Scale  Best Eye Response: 4-->(E4) spontaneous  Best Motor Response: 6-->(M6) obeys commands  Best Verbal Response: 5-->(V5) oriented  Andrea Coma Scale Score: 15  Assessment Qualifiers: patient not sedated/intubated        24 hr Temp:  [97.1 °F (36.2 °C)-98.4 °F (36.9 °C)]     CV:   Rhythm: sinus bradycardia  BP goals:   SBP < 180  MAP > 85    Resp:   O2 Device (Oxygen Therapy): room air       Plan: N/A    GI/:  MAHAD Total Score: 20  Diet/Nutrition Received: regular, full liquid  Last Bowel Movement: 10/02/20       Intake/Output Summary (Last 24 hours) at 10/3/2020 1748  Last data filed at 10/3/2020 1505  Gross per 24 hour   Intake 247 ml   Output 900 ml   Net -653 ml     Unmeasured Output  Urine Occurrence: 1    Labs/Accuchecks:  Recent Labs   Lab 10/03/20  0312   WBC 8.27   RBC 4.19*   HGB 12.8*   HCT 39.8*         Recent Labs   Lab 10/02/20  1903 10/03/20  0312   * 146*   K 3.3* 3.3*   CO2 28 22*    110   BUN 19 18   CREATININE 0.9 0.9   ALKPHOS 62  --    ALT 16  --    AST 19  --    BILITOT 0.6  --       Recent Labs   Lab 10/02/20  1903   INR 1.0      Recent Labs   Lab 10/02/20  1903   TROPONINI <0.006       Electrolytes: No replacement orders  Accuchecks: Q6H    Gtts:      LDA/Wounds:  Lines/Drains/Airways     Peripheral Intravenous Line                 Peripheral IV - Single Lumen 10/02/20 1903 20 G Right Antecubital less than 1 day         Peripheral IV - Single Lumen 10/02/20 1926 18 G Left Antecubital less than 1 day              Wounds: No  Wound care consulted: No

## 2020-10-04 VITALS
BODY MASS INDEX: 24.29 KG/M2 | TEMPERATURE: 97 F | RESPIRATION RATE: 17 BRPM | HEART RATE: 68 BPM | WEIGHT: 164 LBS | DIASTOLIC BLOOD PRESSURE: 88 MMHG | OXYGEN SATURATION: 94 % | HEIGHT: 69 IN | SYSTOLIC BLOOD PRESSURE: 166 MMHG

## 2020-10-04 PROBLEM — I71.21 ASCENDING AORTIC ANEURYSM: Status: ACTIVE | Noted: 2020-10-04

## 2020-10-04 LAB
ALBUMIN SERPL BCP-MCNC: 3.3 G/DL (ref 3.5–5.2)
ALP SERPL-CCNC: 60 U/L (ref 55–135)
ALT SERPL W/O P-5'-P-CCNC: 16 U/L (ref 10–44)
ANION GAP SERPL CALC-SCNC: 9 MMOL/L (ref 8–16)
ASCENDING AORTA: 4.93 CM
AST SERPL-CCNC: 18 U/L (ref 10–40)
AV INDEX (PROSTH): 0.58
AV MEAN GRADIENT: 4 MMHG
AV PEAK GRADIENT: 7 MMHG
AV VALVE AREA: 2.39 CM2
AV VELOCITY RATIO: 0.71
BASOPHILS # BLD AUTO: 0.06 K/UL (ref 0–0.2)
BASOPHILS NFR BLD: 0.7 % (ref 0–1.9)
BILIRUB SERPL-MCNC: 0.7 MG/DL (ref 0.1–1)
BSA FOR ECHO PROCEDURE: 1.9 M2
BUN SERPL-MCNC: 16 MG/DL (ref 8–23)
CALCIUM SERPL-MCNC: 8.5 MG/DL (ref 8.7–10.5)
CHLORIDE SERPL-SCNC: 109 MMOL/L (ref 95–110)
CO2 SERPL-SCNC: 25 MMOL/L (ref 23–29)
CREAT SERPL-MCNC: 0.8 MG/DL (ref 0.5–1.4)
CV ECHO LV RWT: 0.55 CM
DIFFERENTIAL METHOD: ABNORMAL
DOP CALC AO PEAK VEL: 1.34 M/S
DOP CALC AO VTI: 36.02 CM
DOP CALC LVOT AREA: 4.1 CM2
DOP CALC LVOT DIAMETER: 2.29 CM
DOP CALC LVOT PEAK VEL: 0.95 M/S
DOP CALC LVOT STROKE VOLUME: 85.95 CM3
DOP CALCLVOT PEAK VEL VTI: 20.88 CM
E WAVE DECELERATION TIME: 172.2 MSEC
E/A RATIO: 1.38
E/E' RATIO: 9.47 M/S
ECHO LV POSTERIOR WALL: 1.19 CM (ref 0.6–1.1)
EOSINOPHIL # BLD AUTO: 0.5 K/UL (ref 0–0.5)
EOSINOPHIL NFR BLD: 5 % (ref 0–8)
ERYTHROCYTE [DISTWIDTH] IN BLOOD BY AUTOMATED COUNT: 13.1 % (ref 11.5–14.5)
EST. GFR  (AFRICAN AMERICAN): >60 ML/MIN/1.73 M^2
EST. GFR  (NON AFRICAN AMERICAN): >60 ML/MIN/1.73 M^2
FRACTIONAL SHORTENING: 33 % (ref 28–44)
GLUCOSE SERPL-MCNC: 81 MG/DL (ref 70–110)
HCT VFR BLD AUTO: 41.4 % (ref 40–54)
HGB BLD-MCNC: 12.8 G/DL (ref 14–18)
IMM GRANULOCYTES # BLD AUTO: 0.02 K/UL (ref 0–0.04)
IMM GRANULOCYTES NFR BLD AUTO: 0.2 % (ref 0–0.5)
INTERVENTRICULAR SEPTUM: 1.17 CM (ref 0.6–1.1)
IVRT: 111.32 MSEC
LA MAJOR: 6.1 CM
LA MINOR: 6.07 CM
LA WIDTH: 3.54 CM
LEFT ATRIUM SIZE: 3.76 CM
LEFT ATRIUM VOLUME INDEX: 36.3 ML/M2
LEFT ATRIUM VOLUME: 68.84 CM3
LEFT INTERNAL DIMENSION IN SYSTOLE: 2.91 CM (ref 2.1–4)
LEFT VENTRICLE DIASTOLIC VOLUME INDEX: 44.6 ML/M2
LEFT VENTRICLE DIASTOLIC VOLUME: 84.67 ML
LEFT VENTRICLE MASS INDEX: 96 G/M2
LEFT VENTRICLE SYSTOLIC VOLUME INDEX: 17.1 ML/M2
LEFT VENTRICLE SYSTOLIC VOLUME: 32.48 ML
LEFT VENTRICULAR INTERNAL DIMENSION IN DIASTOLE: 4.34 CM (ref 3.5–6)
LEFT VENTRICULAR MASS: 182.82 G
LV LATERAL E/E' RATIO: 9 M/S
LV SEPTAL E/E' RATIO: 10 M/S
LYMPHOCYTES # BLD AUTO: 2.3 K/UL (ref 1–4.8)
LYMPHOCYTES NFR BLD: 25 % (ref 18–48)
MAGNESIUM SERPL-MCNC: 1.9 MG/DL (ref 1.6–2.6)
MCH RBC QN AUTO: 30.2 PG (ref 27–31)
MCHC RBC AUTO-ENTMCNC: 30.9 G/DL (ref 32–36)
MCV RBC AUTO: 98 FL (ref 82–98)
MONOCYTES # BLD AUTO: 1.1 K/UL (ref 0.3–1)
MONOCYTES NFR BLD: 12 % (ref 4–15)
MV PEAK A VEL: 0.65 M/S
MV PEAK E VEL: 0.9 M/S
MV STENOSIS PRESSURE HALF TIME: 49.94 MS
MV VALVE AREA P 1/2 METHOD: 4.41 CM2
NEUTROPHILS # BLD AUTO: 5.2 K/UL (ref 1.8–7.7)
NEUTROPHILS NFR BLD: 57.1 % (ref 38–73)
NRBC BLD-RTO: 0 /100 WBC
PHOSPHATE SERPL-MCNC: 2.4 MG/DL (ref 2.7–4.5)
PISA TR MAX VEL: 2.46 M/S
PLATELET # BLD AUTO: 224 K/UL (ref 150–350)
PMV BLD AUTO: 9.9 FL (ref 9.2–12.9)
POCT GLUCOSE: 90 MG/DL (ref 70–110)
POCT GLUCOSE: 92 MG/DL (ref 70–110)
POTASSIUM SERPL-SCNC: 3.3 MMOL/L (ref 3.5–5.1)
PROT SERPL-MCNC: 5.6 G/DL (ref 6–8.4)
RA MAJOR: 4.11 CM
RA PRESSURE: 3 MMHG
RA WIDTH: 2.55 CM
RBC # BLD AUTO: 4.24 M/UL (ref 4.6–6.2)
RIGHT VENTRICULAR END-DIASTOLIC DIMENSION: 3.46 CM
RV TISSUE DOPPLER FREE WALL SYSTOLIC VELOCITY 1 (APICAL 4 CHAMBER VIEW): 12.03 CM/S
SINUS: 3.78 CM
SODIUM SERPL-SCNC: 143 MMOL/L (ref 136–145)
STJ: 3.51 CM
TDI LATERAL: 0.1 M/S
TDI SEPTAL: 0.09 M/S
TDI: 0.1 M/S
TR MAX PG: 24 MMHG
TRICUSPID ANNULAR PLANE SYSTOLIC EXCURSION: 1.98 CM
TV REST PULMONARY ARTERY PRESSURE: 27 MMHG
WBC # BLD AUTO: 9.03 K/UL (ref 3.9–12.7)

## 2020-10-04 PROCEDURE — 83735 ASSAY OF MAGNESIUM: CPT

## 2020-10-04 PROCEDURE — 80053 COMPREHEN METABOLIC PANEL: CPT

## 2020-10-04 PROCEDURE — 99239 PR HOSPITAL DISCHARGE DAY,>30 MIN: ICD-10-PCS | Mod: ,,, | Performed by: PSYCHIATRY & NEUROLOGY

## 2020-10-04 PROCEDURE — 99239 HOSP IP/OBS DSCHRG MGMT >30: CPT | Mod: ,,, | Performed by: PSYCHIATRY & NEUROLOGY

## 2020-10-04 PROCEDURE — 94761 N-INVAS EAR/PLS OXIMETRY MLT: CPT

## 2020-10-04 PROCEDURE — 84100 ASSAY OF PHOSPHORUS: CPT

## 2020-10-04 PROCEDURE — 25000003 PHARM REV CODE 250: Performed by: PHYSICIAN ASSISTANT

## 2020-10-04 PROCEDURE — 63600175 PHARM REV CODE 636 W HCPCS: Performed by: PHYSICIAN ASSISTANT

## 2020-10-04 PROCEDURE — 85025 COMPLETE CBC W/AUTO DIFF WBC: CPT

## 2020-10-04 PROCEDURE — 25000003 PHARM REV CODE 250: Performed by: STUDENT IN AN ORGANIZED HEALTH CARE EDUCATION/TRAINING PROGRAM

## 2020-10-04 PROCEDURE — 11000001 HC ACUTE MED/SURG PRIVATE ROOM

## 2020-10-04 PROCEDURE — 36415 COLL VENOUS BLD VENIPUNCTURE: CPT

## 2020-10-04 RX ORDER — ASPIRIN 81 MG/1
81 TABLET ORAL DAILY
Qty: 30 TABLET | Refills: 0 | Status: SHIPPED | OUTPATIENT
Start: 2020-10-04

## 2020-10-04 RX ORDER — HYDRALAZINE HYDROCHLORIDE 20 MG/ML
10 INJECTION INTRAMUSCULAR; INTRAVENOUS EVERY 6 HOURS PRN
Status: DISCONTINUED | OUTPATIENT
Start: 2020-10-04 | End: 2020-10-05 | Stop reason: HOSPADM

## 2020-10-04 RX ORDER — POTASSIUM CHLORIDE 20 MEQ/1
40 TABLET, EXTENDED RELEASE ORAL EVERY 4 HOURS
Status: DISPENSED | OUTPATIENT
Start: 2020-10-04 | End: 2020-10-04

## 2020-10-04 RX ADMIN — HEPARIN SODIUM 5000 UNITS: 5000 INJECTION INTRAVENOUS; SUBCUTANEOUS at 05:10

## 2020-10-04 RX ADMIN — CLOPIDOGREL 75 MG: 75 TABLET, FILM COATED ORAL at 08:10

## 2020-10-04 RX ADMIN — ATORVASTATIN CALCIUM 40 MG: 20 TABLET, FILM COATED ORAL at 08:10

## 2020-10-04 RX ADMIN — POTASSIUM CHLORIDE 40 MEQ: 1500 TABLET, EXTENDED RELEASE ORAL at 08:10

## 2020-10-04 RX ADMIN — DOCUSATE SODIUM 50MG AND SENNOSIDES 8.6MG 1 TABLET: 8.6; 5 TABLET, FILM COATED ORAL at 08:10

## 2020-10-04 RX ADMIN — ASPIRIN 81 MG: 81 TABLET, COATED ORAL at 08:10

## 2020-10-04 NOTE — NURSING
1800 Discharge instructions reviewed with patient.  Pt voice understanding.  Peripheral IV x 2 removed.  No acute distress noted.  Await transportation setup.     1820 Pt discharged to [facility] via [facility] setup per .

## 2020-10-04 NOTE — PLAN OF CARE
Problem: Adjustment to Illness (Stroke, Ischemic/Transient Ischemic Attack)  Goal: Optimal Coping  Outcome: Ongoing, Progressing  Intervention: Support Patient/Family Psychosocial Response to Stroke  Flowsheets (Taken 10/4/2020 0359)  Supportive Measures:   active listening utilized   positive reinforcement provided  Family/Support System Care: support provided     Problem: Cerebral Tissue Perfusion Risk (Stroke, Ischemic/Transient Ischemic Attack)  Goal: Optimal Cerebral Tissue Perfusion  Outcome: Ongoing, Progressing  Intervention: Protect and Optimize Cerebral Perfusion  Flowsheets (Taken 10/4/2020 0358)  Sensory Stimulation Regulation: care clustered  Cerebral Perfusion Promotion: blood pressure monitored      POC reviewed with patient-- verbalized understanding. Pt stepped down from [facility]. Only neuro deficit is slightly slurred speech- see flow sheet for full assessment. VSS. No acute events this shift. Pt progressing toward goals.

## 2020-10-04 NOTE — PLAN OF CARE
[facility]    HOME HEALTH ORDERS  FACE TO FACE ENCOUNTER    Patient Name: Kwan Ramos  YOB: 1940    PCP: Prasanna Pruitt Jr, MD   PCP Address: [address] / [facility] [address]  PCP Phone Number: [phone]  PCP Fax: [phone]    Encounter Date: 10/04/2020    Admit to Home Health    Diagnoses:  Active Hospital Problems    Diagnosis  POA    *Embolic stroke involving cerebellar artery [I63.449]  Yes    Ascending aortic aneurysm [I71.2]  Unknown    HTN (hypertension) [I10]  Yes    Status post administration of tPA (rtPA) in a different facility within the last 24 hours prior to admission to current facility [Z92.82]  Not Applicable    CAD (coronary artery disease) [I25.10]  Yes    Tobacco abuse [Z72.0]  Yes    Ataxia [R27.0]  Yes    Stroke [I63.9]  Yes      Resolved Hospital Problems   No resolved problems to display.       No future appointments.        I have seen and examined this patient face to face today. My clinical findings that support the need for the home health skilled services and home bound status are the following:  Weakness/numbness causing balance and gait disturbance due to Stroke making it taxing to leave home.    Allergies:Review of patient's allergies indicates:  No Known Allergies    Diet: cardiac diet    Activities: activity as tolerated    Nursing:   SN to complete comprehensive assessment including routine vital signs. Instruct on disease process and s/s of complications to report to MD. Review/verify medication list sent home with the patient at time of discharge  and instruct patient/caregiver as needed. Frequency may be adjusted depending on start of care date.    Notify MD if SBP > 160 or < 90; DBP > 90 or < 50; HR > 120 or < 50; Temp > 101; Other:         CONSULTS:    Occupational Therapy to evaluate and treat. Evaluate home environment for safety and equipment needs. Perform/Instruct on transfers, ADL training, ROM,  and therapeutic exercises.    MISCELLANEOUS CARE:  N/A    WOUND CARE ORDERS  n/a      Medications: Review discharge medications with patient and family and provide education.      Current Discharge Medication List      CONTINUE these medications which have CHANGED    Details   aspirin (ECOTRIN) 81 MG EC tablet Take 1 tablet (81 mg total) by mouth once daily.  Qty: 30 tablet, Refills: 0    Comments: Stop on 11/1/20         CONTINUE these medications which have NOT CHANGED    Details   abiraterone (ZYTIGA) 500 mg Tab Take 1,000 mg by mouth once daily .      atorvastatin (LIPITOR) 80 MG tablet Take 80 mg by mouth once daily.       clopidogreL (PLAVIX) 75 mg tablet Take 75 mg by mouth once daily.      latanoprost 0.005 % ophthalmic solution Place 1 drop into both eyes every evening.      metoprolol succinate (TOPROL-XL) 25 MG 24 hr tablet Take 25 mg by mouth once daily.      tamsulosin (FLOMAX) 0.4 mg Cap Take 0.4 mg by mouth once daily.             I certify that this patient is confined to his home and needs occupational therapy.

## 2020-10-04 NOTE — CONSULTS
Inpatient consult to Physical Medicine Rehab  Consult performed by: Ghislaine Vidal NP  Consult ordered by: VICTOR HUGO Hogan  Reason for consult: assess rehab needs        Reviewed patient history and current admission.  PM&R following.     NAIMA Davenport, FNP-C  Physical Medicine & Rehabilitation   10/04/2020

## 2020-10-04 NOTE — PLAN OF CARE
Wknd LES noted that pt expected to d/c later today pending CT results and needs HH. CM presented to bedside - pt w/ no HH preference so CM forwarded HH orders to [facility] which accepts pt's insurance. Pt reports that he will need a ride to his car which is parked at [facility]. Pt just completed the CT scan approx 2:45pm - will follow for potential d/c and Lyft ride. Pt's address is correct in chart.     Wktraci MIX [id number]

## 2020-10-04 NOTE — NURSING
Received pt from [facility]. Pt lying awake in bed, AAOx4 with some slurred speech, moves all extremities spontaneously with no drift. Hand off neuro exam complete with THOR Sheppard

## 2020-10-04 NOTE — DISCHARGE SUMMARY
[facility]  Vascular Neurology  Comprehensive Stroke Center  Discharge Summary     Summary:     Admit Date: 10/2/2020  6:36 PM    Discharge Date and Time:  10/04/2020 1:13 PM    Attending Physician: Digna Rousseau MD     Discharge Provider: Christine Alonso MD    History of Present Illness: Kwan Ramos is a 80 y.o. male with PMHx MI s/p stent x 2 (on Plavix, statin), skin CA (R facial/nose s/p recent resection), tobacco abuse who presented as a transfer from [facility] for evaluation of dizziness and ataxia. Pt was LKN 1730 when he acutely developed unsteady gait, dizziness, and dysarthria. CTH at OSH demonstrated no acute abnormalities. Telestroke consult performed by Dr. Hall and tPA administered. Transferred to [facility] for further evaluation/possible intervention.  On arrival, pt alert, oriented, able to give a history, and moving all extremities against gravity with some subjective R hand numbness, RUE dysmetria, and mildly slurred speech. He states his symptoms seem to be improving since tPA. Taken for stat CTA Multiphase.    Patient later reports that he was in his usual health prior to onset of symptoms when he first noticed that he didn't seem himself, felt apathetic; reportedly took a baby aspirin. Pt states that his gait had seemed subtley off for about the last week or so then was notably significantly worse today where he felt off balance and as though he was leaning to one side. He also c/o dizziness, N/V, and difficulty using his R hand at that time, but denies any numbness/tingling, HA, or vision changes.  Pt denies personal hx stroke, blood clots, seizure, cardiac arrhythmia; takes Plavix daily at home. He lives alone and performs all ADLs independently. Pt smokes ~2 cigarettes per day, states he has occasionally smoked marijuana for glaucoma (none recently in months); denies alcohol use.    Hospital Course (synopsis of major diagnoses, care, treatment, and  services provided during the course of the hospital stay): Pt presented to the hospital for further evaluation of embolic stroke involving cerebellar artery. S/P TPA on 10/2 post gait imbalance, dysarthria, and RUE ataxia laong with decrease sensation. Neurologically significantly improved post TPA. Repeat MRI with small acute to subacute infarcts in R cerebellum. No associated hemorrhage or sing of mass effect. TTE with mild LA enlargement. Normal LV systolic function EF 65% 4.9 cm ascending aortic aneurysm and mild aortic regurgitation. Will need outpatient evaluation by CT surgery given the size of aneurysm. Pt evaluated by PT/OT with recs for homehealth OT. Stable for discharge home with hh, 30 day cardiac event monitor, follow up with vascular neurology in 1 month and ct surgery in a week.     Stroke Etiology: Undetermined Cause. 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 Level of Consciousness  0-->Alert, keenly responsive   1b. LOC Questions  0-->Answers both questions correctly   1c. LOC Commands  0-->Performs both tasks correctly   2. Best Gaze  0-->Normal   3. Visual  0-->No visual loss   4. Facial Palsy  0-->Normal symmetrical movements   5a. Motor Arm, Left  0-->No drift, limb holds 90 (or 45) degrees for full 10 secs   5b. Motor Arm, Right  0-->No drift, limb holds 90 (or 45) degrees for full 10 secs   6a. Motor Leg, Left  0-->No drift, leg holds 30 degree position for full 5 secs    6b. Motor Leg, Right  0-->No drift, leg holds 30 degree position for full 5 secs   7. Limb Ataxia  0-->Absent   8. Sensory  0-->Normal, no sensory loss   9. Best Language  0-->No aphasia, normal   10. Dysarthria  1-->Mild-to-moderate dysarthria, patient slurs at least some words and, at worst, can be understood with some difficulty   11. Extinction and Inattention (formerly Neglect)  0-->No abnormality   Total (NIH Stroke Scale)  1        Vitals:    10/04/20 1105   BP: 127/82   Pulse: 61   Resp: 19   Temp: 97.1 °F (36.2 °C)       Vitals signs and nursing note reviewed.   Constitutional:       General: He is not in acute distress.     Appearance: Normal appearance. He is well-developed. He is not ill-appearing.   HENT:      Head: Normocephalic and atraumatic.      Mouth/Throat:      Mouth: Mucous membranes are moist.   Eyes:      General: No scleral icterus.     Extraocular Movements: Extraocular movements intact.      Pupils: Pupils are equal, round, and reactive to light.      Comments: L sided nystagmus    Neck:      Musculoskeletal: Normal range of motion and neck supple.      Vascular: No JVD.      Trachea: No tracheal deviation.   Cardiovascular:      Rate and Rhythm: Normal rate and regular rhythm.      Heart sounds: Normal heart sounds. No murmur. No friction rub. No gallop.    Pulmonary:      Effort: Pulmonary effort is normal. No respiratory distress.      Breath sounds: Normal breath sounds. No wheezing or rales.   Chest:      Chest wall: No tenderness.   Abdominal:      General: Bowel sounds are normal. There is no distension.      Palpations: Abdomen is soft.      Tenderness: There is no abdominal tenderness. There is no guarding or rebound.   Musculoskeletal: Normal range of motion.   Lymphadenopathy:      Cervical: No cervical adenopathy.   Skin:     General: Skin is warm and dry.      Capillary Refill: Capillary refill takes less than 2 seconds.      Findings: No rash.   Neurological:      General:  No focal deficit present.      Mental Status: He is alert and oriented to person, place, and time.      Motor: No weakness.     Modified Jemison Score: 0  [place] Coma Scale:    ABCD2 Score:    [id number] Score:   HAS -BLED Score:   ICH Score:   Hunt & Betancourt Classification:       Assessment/Plan:     Diagnostic Results:    Brain Imaging   CTH WO 10/2) Punctate foci of low attenuation within the right basal ganglia, suggest sequela of prominent perivascular spaces however age-indeterminate infarcts cannot be excluded given that there are no previous examinations available for comparison. Sinus disease, noting chronic component involving the right maxillary sinus. Sequela of chronic microvascular ischemic change and senescent change.  MRI Brain w wo contrast-   Multiple small acute to early subacute infarcts in the right cerebellum.  No associated hemorrhage or significant mass effect associated with these areas of cytotoxic edema.  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  Vessel Imaging   CTA 10/2) No acute abnormality on CTA evaluation of the head/neck.  No high-grade stenosis, occlusion, or aneurysm.   Cardiac Imaging   TTE 10/4) The left ventricle is normal in size with normal systolic function. The estimated ejection fraction is 65%. The ascending aorta is severely dilated at 4.9cm. Mild aortic regurgitation.  Interventions:tpa     Complications: None    Disposition:     Final Active Diagnoses:    Diagnosis Date Noted POA    PRINCIPAL PROBLEM:  Embolic stroke involving cerebellar artery [I63.449] 10/02/2020 Yes    Ascending aortic aneurysm [I71.2] 10/04/2020 Unknown    HTN (hypertension) [I10] 10/03/2020 Yes    Status post administration of tPA (rtPA) in a different facility within the last 24 hours prior to admission to  current facility [Z92.82] 10/02/2020 Not Applicable    CAD (coronary artery disease) [I25.10] 10/02/2020 Yes    Tobacco abuse [Z72.0] 10/02/2020 Yes    Ataxia [R27.0] 10/02/2020 Yes    Stroke [I63.9] 10/02/2020 Yes      Problems Resolved During this Admission:     No new Assessment & Plan notes have been filed under this hospital service since the last note was generated.  Service: Vascular Neurology      Recommendations:     Post-discharge complication risks: None    Stroke Education given to: patient    Follow-up in Stroke Clinic in 30 days.     Discharge Plan:  Antithrombotics: Aspirin 81mg  Statin: Atorvastatin 40 mg    Follow Up:      Patient Instructions:      Ambulatory referral/consult to Vascular Neurology   Standing Status: Future   Referral Priority: Routine Referral Type: Consultation   Referral Reason: Specialty Services Required   Requested Specialty: Vascular Neurology   Number of Visits Requested: 1     Ambulatory referral/consult to Cardiothoracic Surgery   Standing Status: Future   Referral Priority: Urgent Referral Type: Consultation   Referral Reason: Specialty Services Required   Requested Specialty: Cardiothoracic Surgery   Number of Visits Requested: 1       Medications:  Reconciled Home Medications:      Medication List      CONTINUE taking these medications    aspirin 81 MG EC tablet  Commonly known as: ECOTRIN  Take 1 tablet (81 mg total) by mouth once daily.     atorvastatin 80 MG tablet  Commonly known as: LIPITOR  Take 80 mg by mouth once daily.     clopidogreL 75 mg tablet  Commonly known as: PLAVIX  Take 75 mg by mouth once daily.     latanoprost 0.005 % ophthalmic solution  Place 1 drop into both eyes every evening.     metoprolol succinate 25 MG 24 hr tablet  Commonly known as: TOPROL-XL  Take 25 mg by mouth once daily.     tamsulosin 0.4 mg Cap  Commonly known as: FLOMAX  Take 0.4 mg by mouth once daily.     ZYTIGA 500 mg Tab  Generic drug: abiraterone  Take 1,000 mg by mouth  once daily .            Christine Alonso MD  Comprehensive Stroke Center  Department of Vascular Neurology   [facility]

## 2020-10-05 ENCOUNTER — CLINICAL SUPPORT (OUTPATIENT)
Dept: CARDIOLOGY | Facility: HOSPITAL | Age: 80
End: 2020-10-05
Attending: STUDENT IN AN ORGANIZED HEALTH CARE EDUCATION/TRAINING PROGRAM
Payer: MEDICARE

## 2020-10-05 ENCOUNTER — TELEPHONE (OUTPATIENT)
Dept: CARDIOLOGY | Facility: HOSPITAL | Age: 80
End: 2020-10-05

## 2020-10-05 PROCEDURE — 93272 ECG/REVIEW INTERPRET ONLY: CPT | Mod: ,,, | Performed by: INTERNAL MEDICINE

## 2020-10-05 PROCEDURE — 93272 CARDIAC EVENT MONITOR (CUPID ONLY): ICD-10-PCS | Mod: ,,, | Performed by: INTERNAL MEDICINE

## 2020-10-05 PROCEDURE — 93271 ECG/MONITORING AND ANALYSIS: CPT

## 2020-10-05 NOTE — HOSPITAL COURSE
10/05/2020: Bed mobility ***.  Sit to stand *** and transfers ***.  Ambulated ***.  UBD *** and LBD ***.

## 2020-10-05 NOTE — TELEPHONE ENCOUNTER
Due to inventory, Mr Villegas's Cardiac monitor will be shipped to his address. Patient verbalized understanding

## 2020-10-05 NOTE — HPI
Per chart review, Kwan Ramos is a 80-year-old male with PMHx of CVA (2014),  MI x 2 stents (2001), atherosclerosis, HLD, skin cancer and smoking .  Patient presented to [facility] on *** with ***.  CTH revealed no acute pathology ***. tPA was recommended and administered***.  Not a tPA candidate 2/2 outside treatment window***. CTA revealed***.  MRI brain revealed ***.  Not an IR candidate/IR intervention***.     Functional History: Patient lives with *** in a *** story home with*** to enter.  Prior to admission, (I) with ADLs and mobility***. DME: ***.

## 2020-10-06 ENCOUNTER — PATIENT OUTREACH (OUTPATIENT)
Dept: ADMINISTRATIVE | Facility: CLINIC | Age: 80
End: 2020-10-06

## 2020-10-06 NOTE — PATIENT INSTRUCTIONS
Discharge Instructions for Stroke  You have been diagnosed with a stroke, or with a TIA (transient ischemic attack). Or you have been identified as having a high risk for stroke. During a stroke, blood stops flowing to part of your brain. This can damage areas in the brain that control other parts of the body. Symptoms after a stroke depend on which part of the brain has been affected.  Stroke risk factors  Once youve had a stroke, youre at greater risk for another one. Listed below are some other factors that can increase your risk for a stroke:  · High blood pressure  · High cholesterol  · Cigarette or cigar smoking  · Diabetes  · Carotid or other artery disease  · Atrial fibrillation, atrial flutter, or other heart disease  · Not being physically active  · Obesity  · Certain blood disorders (such as sickle cell anemia)  · Excessive alcohol use  · Abuse of street drugs  · Race  · Gender  · Family history of stroke  · Diet high in salty, fried, or greasy foods  Changes in daily living  Doing your regular tasks may be difficult after youve had a stroke, but you can learn new ways to manage your daily activities. In fact, doing daily activities may help you to regain muscle strength and bring back function to affected limbs. Be patient, give yourself time to adjust, and appreciate the progress you make.  Daily activities  You may be at risk of falling. Make changes to your home to help you walk more easily. A therapist will decide if you need an assistive device to walk safely.  You may need to see an occupational therapist or physical therapist to learn new ways of doing things. For example, you may need to make adjustments when bathing or dressing:  Tips for showering or bathing  · Test the water temperature with a hand or foot that was not affected by the stroke.  · Use grab bars, a shower seat, a hand-held showerhead, and a long-handled brush.  Tips for getting dressed  · Dress while sitting, starting with  the affected side or limb.  · Wear shirts that pull easily over your head. Wear pants or skirts with elastic waistbands.  · Use zippers with loops attached to the pull tabs.  Lifestyle changes  · Take your medicines exactly as directed. Dont skip doses.  · Begin an exercise program. Ask your provider how to get started. Also ask how much activity you should try to get on a daily or weekly basis. You can benefit from simple activities such as walking or gardening.  · Limit alcohol intake. Men should have no more than 2 alcoholic drinks a day. Women should limit themselves to 1 alcoholic drink per day.  · Know your cholesterol level. Follow your providers recommendations about how to keep cholesterol under control.  · If you are a smoker, quit now. Join a stop-smoking program to improve your chances of success. Ask your provider about medicines or other methods to help you quit.  · Learn stress management techniques to help you deal with stress in your home and work life.  Diet  Your healthcare provider will give you information on dietary changes that you may need to make, based on your situation. Your provider may recommend that you see a registered dietitian for help with diet changes. Changes may include:  · Reducing fat and cholesterol intake  · Reducing salt (sodium) intake, especially if you have high blood pressure  · Eating more fresh vegetables and fruits  · Eating more lean proteins, such as fish, poultry, and beans and peas (legumes)  · Eating less red meat and processed meats  · Using low-fat dairy products  · Limiting vegetable oils and nut oils  · Limiting sweets and processed foods such as chips, cookies, and baked goods  Follow-up care  · Keep your medical appointments. Close follow-up is important to stroke rehabilitation and recovery.  · Some medicines require blood tests to check for progress or problems. Keep follow-up appointments for any blood tests ordered by your providers.  When to call  911  Call 911 right away if you have any of the following symptoms of stroke:  · Weakness, tingling, or loss of feeling on one side of your face or body  · Sudden double vision or trouble seeing in one or both eyes  · Sudden trouble talking or slurred speech  · Trouble understanding others  · Sudden, severe headache  · Dizziness, loss of balance, or a sense of falling  · Blackouts or seizures      F.A.S.T. is an easy way to remember the signs of stroke. When you see these signs, you know that you need to call 911 fast.  F.A.S.T. stands for:  · F is for face drooping. One side of the face is drooping or numb. When the person smiles, the smile is uneven.  · A is for arm weakness. One arm is weak or numb. When the person lifts both arms at the same time, one arm may drift downward.  · S is for speech difficulty. You may notice slurred speech or trouble speaking. The person can't repeat a simple sentence correctly when asked.  · T is for time to call 911. If someone shows any of these symptoms, even if they go away, call 911 immediately. Make note of the time the symptoms first appeared.  Date Last Reviewed: 8/26/2015 © 2000-2017 [employer]. [address] [place] [address], [place] [address]. All rights reserved. This information is not intended as a substitute for professional medical care. Always follow your healthcare professional's instructions.

## 2020-10-08 ENCOUNTER — TELEPHONE (OUTPATIENT)
Dept: NEUROLOGY | Facility: CLINIC | Age: 80
End: 2020-10-08

## 2020-11-02 ENCOUNTER — HOME CARE VISIT (OUTPATIENT)
Dept: NEUROLOGY | Facility: HOSPITAL | Age: 80
End: 2020-11-02

## 2020-11-03 NOTE — PROGRESS NOTES
Mr. Ramos initial Stroke [facility] visit completed as PV. Patient denies any personal or familial history of stroke. PMH of HLP, HTN, and tobacco abuse. SM nurse discussed and educated patient on 30 event monitor he is currently wearing, definition of stroke, stroke RF present, medication reconciliation completed, and stroke S/S including what to do if they occur. Also educated patient on purpose of SM program. Mr. Ramos has Home health services for physical therapy. Speech and balance residual effects are still present.

## 2020-11-09 ENCOUNTER — OFFICE VISIT (OUTPATIENT)
Dept: NEUROLOGY | Facility: CLINIC | Age: 80
End: 2020-11-09
Payer: MEDICARE

## 2020-11-09 VITALS
BODY MASS INDEX: 24.29 KG/M2 | HEART RATE: 58 BPM | WEIGHT: 164 LBS | HEIGHT: 69 IN | SYSTOLIC BLOOD PRESSURE: 117 MMHG | DIASTOLIC BLOOD PRESSURE: 74 MMHG

## 2020-11-09 DIAGNOSIS — R47.1 DYSARTHRIA AND ANARTHRIA: ICD-10-CM

## 2020-11-09 DIAGNOSIS — R27.0 ATAXIA: ICD-10-CM

## 2020-11-09 DIAGNOSIS — Z87.891 FORMER HEAVY TOBACCO SMOKER: ICD-10-CM

## 2020-11-09 DIAGNOSIS — I71.21 ASCENDING AORTIC ANEURYSM: ICD-10-CM

## 2020-11-09 DIAGNOSIS — Z86.73 HISTORY OF STROKE INVOLVING CEREBELLUM: Primary | ICD-10-CM

## 2020-11-09 DIAGNOSIS — I25.10 CORONARY ARTERY DISEASE WITHOUT ANGINA PECTORIS, UNSPECIFIED VESSEL OR LESION TYPE, UNSPECIFIED WHETHER NATIVE OR TRANSPLANTED HEART: ICD-10-CM

## 2020-11-09 DIAGNOSIS — I10 ESSENTIAL HYPERTENSION: ICD-10-CM

## 2020-11-09 PROCEDURE — 99215 OFFICE O/P EST HI 40 MIN: CPT | Mod: S$GLB,,, | Performed by: PSYCHIATRY & NEUROLOGY

## 2020-11-09 PROCEDURE — 1101F PR PT FALLS ASSESS DOC 0-1 FALLS W/OUT INJ PAST YR: ICD-10-PCS | Mod: CPTII,S$GLB,, | Performed by: PSYCHIATRY & NEUROLOGY

## 2020-11-09 PROCEDURE — 1126F AMNT PAIN NOTED NONE PRSNT: CPT | Mod: S$GLB,,, | Performed by: PSYCHIATRY & NEUROLOGY

## 2020-11-09 PROCEDURE — 99215 PR OFFICE/OUTPT VISIT, EST, LEVL V, 40-54 MIN: ICD-10-PCS | Mod: S$GLB,,, | Performed by: PSYCHIATRY & NEUROLOGY

## 2020-11-09 PROCEDURE — 1159F PR MEDICATION LIST DOCUMENTED IN MEDICAL RECORD: ICD-10-PCS | Mod: S$GLB,,, | Performed by: PSYCHIATRY & NEUROLOGY

## 2020-11-09 PROCEDURE — 1159F MED LIST DOCD IN RCRD: CPT | Mod: S$GLB,,, | Performed by: PSYCHIATRY & NEUROLOGY

## 2020-11-09 PROCEDURE — 1101F PT FALLS ASSESS-DOCD LE1/YR: CPT | Mod: CPTII,S$GLB,, | Performed by: PSYCHIATRY & NEUROLOGY

## 2020-11-09 PROCEDURE — 99999 PR PBB SHADOW E&M-EST. PATIENT-LVL V: ICD-10-PCS | Mod: PBBFAC,,, | Performed by: PSYCHIATRY & NEUROLOGY

## 2020-11-09 PROCEDURE — 99999 PR PBB SHADOW E&M-EST. PATIENT-LVL V: CPT | Mod: PBBFAC,,, | Performed by: PSYCHIATRY & NEUROLOGY

## 2020-11-09 PROCEDURE — 1126F PR PAIN SEVERITY QUANTIFIED, NO PAIN PRESENT: ICD-10-PCS | Mod: S$GLB,,, | Performed by: PSYCHIATRY & NEUROLOGY

## 2020-11-09 RX ORDER — ATORVASTATIN CALCIUM 20 MG/1
20 TABLET, FILM COATED ORAL DAILY
Qty: 30 TABLET | Refills: 11 | Status: SHIPPED | OUTPATIENT
Start: 2020-11-09 | End: 2021-11-09

## 2020-11-09 RX ORDER — A/SINGAPORE/GP1908/2015 IVR-180 (AN A/MICHIGAN/45/2015 (H1N1)PDM09-LIKE VIRUS, A/HONG KONG/4801/2014, NYMC X-263B (H3N2) (AN A/HONG KONG/4801/2014-LIKE VIRUS), AND B/BRISBANE/60/2008, WILD TYPE (A B/BRISBANE/60/2008-LIKE VIRUS) 15; 15; 15 UG/.5ML; UG/.5ML; UG/.5ML
INJECTION, SUSPENSION INTRAMUSCULAR
COMMUNITY
Start: 2020-09-17

## 2020-11-09 RX ORDER — TAMSULOSIN HYDROCHLORIDE 0.4 MG/1
0.4 CAPSULE ORAL
COMMUNITY
Start: 2018-09-07

## 2020-11-09 RX ORDER — PREDNISONE 5 MG/1
TABLET ORAL
COMMUNITY
Start: 2020-11-03

## 2020-11-09 NOTE — PROGRESS NOTES
Vascular Neurology  Clinic Note    Reason For Visit (Chief Complaint):  cerebellar stroke    HPI: 80 y.o. man with CAD s/p MARY (on Plavix), skin CA (s/p R facial resection), tobacco abuse, who presented with R cerebellar stroke last month.   He is here for followup.    Briefly, Mr. Ramos presented to [facility] with dizziness and ataxia.  He received Telestroke Consult with Dr. Hall, and received IV tPA for NIHSS 3 (dysarthria, ataxia, sensory loss).  He was transferred to [facility] for further care.  MRI revealed scattered infarcts in R cerebellum/vermis.  He was started on DAPT.  TTE showed mild LAE.  Ascending aortic aneurysm (4.9 cm) also noted - followup arranged as outpatient.    Patient doing well.  States he's 95% back to normal.  Still has occasional slurring of words.  Also some slight drooling out of R side of mouth.  Has MCOT in place.  Has had some mild bruising/bleeding.  No blood in stools or urine.  Stopped smoking 10/2 cold turkey.  Has only had mild urges to smoke.  No falls.  Has had a ltitle more difficulty using stairs.    Brain Imaging:  MRI Brain 10/3/20:  Multiple small acute to early subacute infarcts in the right cerebellum.  No associated hemorrhage or significant mass effect associated with these areas of cytotoxic edema.    CTA 10/2/20:  No acute abnormality on CTA evaluation of the head/neck.  No high-grade stenosis, occlusion, or aneurysm.    Cardiac Evaluation:  TTE 10/3/20:  LVEF 65%  Ascending aorta severely dilated - 4.9 cm  Mild LAE      Relevant Labwork:  Recent Labs   Lab 10/02/20  2231   Hemoglobin A1C 5.6   LDL Cholesterol 31.2 L   HDL 39 L   Triglycerides 59   Cholesterol 82 L       I independently viewed the above imaging studies and  I reviewed the reports of the above imaging.  I reviewed the above labwork.    Review of Systems  Msk: negative for muscle pain  Skin: negative for pruritis  Neuro: + drooling  All others negative    Past Medical History  Past Medical  "History:   Diagnosis Date    Cancer     HTN (hypertension) 10/3/2020    MI (myocardial infarction) 2013    pt reported     Family History  No FH stroke.    Social History  Was previously smoking 1 ppd.  Quit in early October.  Drinks some EtOH at [employer].      Medication List with Changes/Refills   New Medications    ATORVASTATIN (LIPITOR) 20 MG TABLET    Take 1 tablet (20 mg total) by mouth once daily.   Current Medications    ABIRATERONE (ZYTIGA) 500 MG TAB    Take 1,000 mg by mouth once daily .    ASPIRIN (ECOTRIN) 81 MG EC TABLET    Take 1 tablet (81 mg total) by mouth once daily.    CLOPIDOGREL (PLAVIX) 75 MG TABLET    Take 75 mg by mouth once daily.    FLUAD QUAD 2020-21,65Y UP,,PF, 60 MCG (15 MCG X 4)/0.5 ML SYRG        LATANOPROST 0.005 % OPHTHALMIC SOLUTION    Place 1 drop into both eyes every evening.    METOPROLOL SUCCINATE (TOPROL-XL) 25 MG 24 HR TABLET    Take 25 mg by mouth once daily.    METOPROLOL SUCCINATE 100 MG CSPX    Take 25 mg by mouth.    PREDNISONE (DELTASONE) 5 MG TABLET        TAMSULOSIN (FLOMAX) 0.4 MG CAP    Take 0.4 mg by mouth once daily.    TAMSULOSIN (FLOMAX) 0.4 MG CAP    Take 0.4 mg by mouth.   Discontinued Medications    ATORVASTATIN (LIPITOR) 80 MG TABLET    Take 1 tablet (80 mg total) by mouth once daily.    ATORVASTATIN (LIPITOR) 80 MG TABLET    Take 80 mg by mouth once daily.     ATORVASTATIN CALCIUM (ATORVASTATIN ORAL)    Take 80 mg by mouth.       EXAM  Vital Signs  Pulse: (!) 58  BP: 117/74  Pain Score: 0-No pain  Height and Weight  Height: 5' 9" (175.3 cm)  Weight: 74.4 kg (164 lb)  BSA (Calculated - sq m): 1.9 sq meters  BMI (Calculated): 24.2  Weight in (lb) to have BMI = 25: 168.9]  General: well appearing without discomfort  Eyes: conjunctiva clear, no icterus  Neck: no carotid bruits  CV: RRR, nL S1&S2  Resp: breathing comfortably, no wheezing  Ext: wwp, no pedal edema  Skin: no lesions or bruises    Mental Status: Alert and oriented, normal attention, speech " fluent and prosodic, naming and repetition intact, follows multistep embedded commands, no e/o neglect or extinction  Cranial Nerves: PERRL, EOMI, VFF, sensation intact, slight R NLF flattening, mild dysarthria, SCM/trap 5/5  Motor: Normal bulk and tone, no drift, finger taps symmetric  Strength 5/5 throughout  Sensory: intact light touch bilaterally, intact proprioception bilaterally  Coordination: hypermetria on mirror testing on R, finger to nose slower on R  Gait & Stance: able to rise without using arms, able to take 3 steps tandem  DTR: 2+ symmetric    NIH Stroke Scale:    Level of Consciousness: 0 - alert  LOC Questions: 0 - answers both correctly  LOC Commands: 0 - performs both correctly  Best Gaze: 0 - normal  Visual: 0 - no visual loss  Facial Palsy: 1 - minor  Motor Left Arm: 0 - no drift  Motor Right Arm: 0 - no drift  Motor Left Le - no drift  Motor Right Le - no drift  Limb Ataxia: 1 - present in one limb  Sensory: 0 - normal  Best Language: 0 - no aphasia  Dysarthria: 1 - mild to moderate dysarthria  Extinction and Inattention: 0 - no neglect  NIH Stroke Scale Total: 3        ___________________  ASSESSMENT & PLAN  80 y.o. man with CAD s/p MARY (on Plavix), skin CA (s/p R facial resection), tobacco abuse, who presented with R cerebellar stroke last month.  Etiology ESUS.  30d cardiac monitor ongoing.  Has mild dysarthria and ataxia, but doing well functionally.    - Continue Plavix 75 mg daily for secondary stroke prevention.  Ok to stop ASA (did >30d DAPT),  - Decrease atorvastatin to 20 mg daily.  - Continue good BP control.  - Referral to CT surgery for aortic aneurysm.  - F/U results of heart monitor. --> consider ILR if negative  - Congratulated patient on quitting smoking.  - Referral to SLP for dysarthria and drooling.  - Mediterranean Diet for stroke prevention.  - RTC annually.    Problem List Items Addressed This Visit        Unprioritized    CAD (coronary artery disease)    Ataxia     HTN (hypertension)    Ascending aortic aneurysm    Relevant Orders    Ambulatory referral/consult to Cardiothoracic Surgery    Dysarthria and anarthria    Former heavy tobacco smoker    RESOLVED: Stroke - Primary          Digna Rousseau MD  Vascular Neurology

## 2020-11-09 NOTE — PROGRESS NOTES
Please see evaluation in POC.     JUANA Alexis, CCC-SLP  Speech Language Pathologist   11/11/2020

## 2020-11-09 NOTE — LETTER
November 10, 2020      Christine Alonso MD  [address]  [facility] [address]           [place] [facility] - Neurology [facility]  [address]  [facility] [address]  Phone: [phone]          Patient: Kwan Ramos   MR Number: [id number]   YOB: 1940   Date of Visit: 11/9/2020       Dear Dr. Christine Alonso:    Thank you for referring Kwan Ramos to me for evaluation. Attached you will find relevant portions of my assessment and plan of care.    If you have questions, please do not hesitate to call me. I look forward to following Kwan Ramos along with you.    Sincerely,    Digna Rousseau MD    Enclosure  CC:  No Recipients    If you would like to receive this communication electronically, please contact [email or web address] or [phone] to request more information on [employer] access.    For providers and/or their staff who would like to refer a patient to [facility], please contact us through our one-stop-shop provider referral line, [facility], at [phone].    If you feel you have received this communication in error or would no longer like to receive these types of communications, please e-mail [email or web address]

## 2020-11-09 NOTE — PATIENT INSTRUCTIONS
- Continue Plavix 75 mg daily.  Ok to stop aspirin.  - Decrease Atorvastatin to 20 mg daily (cholesterol medicine).  - Continue good blood pressure control.  - Followup with Cardiothoracic Surgery regarding aortic aneurysm ([phone]).  - Followup results of heart monitor (call about 5 days after you return the monitor).  - Referral to speech therapy.    Mediterranean Diet Recommendations    · Eat primarily plant-based foods, such as fruits and vegetables, whole grains, legumes (beans) and nuts.  · Limit refined carbohydrates (white pasta, bread, rice).  · Replace butter with healthy fats such as olive oil.  · Use herbs and spices instead of salt to flavor foods.  · Limit red meat and processed meats to no more than a few times a month.  · Avoid sugary sodas, bakery goods, and sweets.  · Eat fish and poultry at least twice a week.  · Get plenty of exercise (150 minutes per week).    Adopted from John walker al, [employer], 2018.

## 2020-11-10 PROBLEM — Z92.82 STATUS POST ADMINISTRATION OF TPA (RTPA) IN A DIFFERENT FACILITY WITHIN THE LAST 24 HOURS PRIOR TO ADMISSION TO CURRENT FACILITY: Status: RESOLVED | Noted: 2020-10-02 | Resolved: 2020-11-10

## 2020-11-10 PROBLEM — I63.9 STROKE: Status: RESOLVED | Noted: 2020-10-02 | Resolved: 2020-11-10

## 2020-11-10 PROBLEM — I63.449 EMBOLIC STROKE INVOLVING CEREBELLAR ARTERY: Status: RESOLVED | Noted: 2020-10-02 | Resolved: 2020-11-10

## 2020-11-10 PROBLEM — Z72.0 TOBACCO ABUSE: Status: RESOLVED | Noted: 2020-10-02 | Resolved: 2020-11-10

## 2020-11-10 PROBLEM — Z87.891 FORMER HEAVY TOBACCO SMOKER: Status: ACTIVE | Noted: 2020-11-10

## 2020-11-10 PROBLEM — R47.1 DYSARTHRIA AND ANARTHRIA: Status: ACTIVE | Noted: 2020-11-10

## 2020-11-11 ENCOUNTER — CLINICAL SUPPORT (OUTPATIENT)
Dept: REHABILITATION | Facility: HOSPITAL | Age: 80
End: 2020-11-11
Attending: PSYCHIATRY & NEUROLOGY
Payer: MEDICARE

## 2020-11-11 DIAGNOSIS — R47.1 DYSARTHRIA: ICD-10-CM

## 2020-11-11 DIAGNOSIS — R47.89 WORD FINDING DIFFICULTY: ICD-10-CM

## 2020-11-11 DIAGNOSIS — Z86.73 HISTORY OF STROKE INVOLVING CEREBELLUM: ICD-10-CM

## 2020-11-11 PROCEDURE — 92523 SPEECH SOUND LANG COMPREHEN: CPT | Mod: PN | Performed by: SPEECH-LANGUAGE PATHOLOGIST

## 2020-11-11 PROCEDURE — 92507 TX SP LANG VOICE COMM INDIV: CPT | Mod: PN | Performed by: SPEECH-LANGUAGE PATHOLOGIST

## 2020-11-11 NOTE — PATIENT INSTRUCTIONS
Motor Speech Strategies:  · Speak Slowly / Pacing: Try to slow your rate of speech when talking while keeping the same intonation or sing-song quality that is natural to your voice.   · Overarticulation: Over-say all of the sounds in your words.  · Speak Loudly: make sure to project your voice so that others can hear you.  · Deep Breath: Make sure to use deep breathing to support your speech. If you do not have enough breath support, it is difficult to over-articulate, speak loudly, or speak slowly.   · Open Your Mouth: Make sure to open your mouth widely while speaking.

## 2020-11-11 NOTE — PLAN OF CARE
"Outpatient Neurological Rehabilitation  Speech and Language Therapy Evaluation    Date: 11/11/2020     Name: Kwan Ramos   MRN: [id number]    Therapy Diagnosis:   Encounter Diagnoses   Name Primary?    History of stroke involving cerebellum     Dysarthria     Word finding difficulty       Physician: Digna Rousseau MD  Physician Orders: [id number] - Ambulatory consult to Speech Therapy  Medical Diagnosis from Referral: I63.9 (ICD-10-CM) - Stroke    Visit #/Visits authorized: 1/1  Date of Evaluation:  11/11/2020   Insurance Authorization Period: 11/09/2020-11/09/2021  Plan of Care Expiration:  11/11/2020 to 1/6/21     Time In: 11:01  Time Out: 11:41    Procedure Min.   Speech Language Evaluation  40     Precautions:Standard  Subjective   Date of Onset: 10/2/2020  History of Current Condition: Patient reports that he had a stroke on 10/2/2020. Initial symptoms included confusion, disoriented, right weakness, difficulty walking stability, and impaired speech. He drove himself to [facility] where he was admitted for CVA and then was sent to [facility] for 2 nights. Upon discharge, he was immediately impressed of home health care and [facility] support that followed. He reports that he had speech therapy with [facility] for 1 session. Reports that since CVA, he has noticed improvement in his speech but he endorses somewhat slowed and slurred speech. He states "there are times when I know the name of the person, place, or thing but I can't say it." indicating word finding difficulties. He reports that he is "not myself but feel like I'm getting close to my old self but not quite there yet." He has been trying to continue with language stimulation and has been doing crossword puzzles which he reports helps his word finding skills. Difficulty mostly with complex words. Has also noticed inability to remember key strokes on his laptop. Reports that he is getting better with the management of his " "home and almost back to normal. Keeps his apartment very clean and is able to maintain these habits. No difficulty swallowing; denies coughing/choking with foods/liquids. States that he drools on right side of his mouth.   Past Medical History: Kwan Ramos  has a past medical history of Cancer, HTN (hypertension) (10/3/2020), and MI (myocardial infarction) (2013).  Kwan Ramos  has a past surgical history that includes Cardiac surgery.  Medical Hx and Allergies:  Kwan has a current medication list which includes the following prescription(s): abiraterone, aspirin, atorvastatin, clopidogrel, fluad quad 2020-21(65y up)(pf), latanoprost, metoprolol succinate, metoprolol succinate, prednisone, tamsulosin, and tamsulosin. Review of patient's allergies indicates:  No Known Allergies  Imaging: MRI scan 10/03/2020: "Impression:  1. Multiple small acute to early subacute infarcts in the right cerebellum.  No associated hemorrhage or significant mass effect associated with these areas of cytotoxic edema.  2. No other acute findings identified.  3. 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  This report was flagged in Epic as abnormal."   Prior Therapy:  speech therapy 1x   Social History: Patient lives alone. He has a son in [place] and a sister in [place]. His ex-wife lives in [place]. All of these family members are aware of stroke. Patient is retired; he previously worked as the [employer] .  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   Prior Level of Function: Living alone independently; no difficulty communicating; clear speech   Current Level of Function: Word finding deficits, unclear speech at " "times/articulatory distortions   Pain:   0/10  Pain Location / Description: n/a  Nutrition:  Regular/thin  Patient's Therapy Goals: "I'd like my speech to be normalized, I'd like to reach a level of return to my speech patterns and word usage, I'd like to be able to write with some clarity"  Objective     Auditory Comprehension: No concerns reported or observed; WFL for the purpose of assessment and conversation.   Simple y/n Questions: 100% acc independently     Complex y/n Questions: 100% acc independently     Identification: 100% acc independently    1 Step Directions: 100% acc independently     2 Step Directions: 100% acc independently       Verbal Expression: Pt presents with fluent speech and is able to express his/ ideas/wants/needs clearly with minimal difficulty. He demonstrated some difficulty with verbal fluency at the conversation level. Word finding deficits mainly consisted of trouble verbalizing longer, complex words.   Naming  o Confrontational: 95% acc independently     Responsive Speech: 100% acc independently     Repetition: 100% acc independently      Reading Comprehension: Did not assess. No concerns reported or observed.       Written Expression: Did not assess. No concerns reported or observed.       Cognition: No concerns reported or observed. Pt and alert and cooperative throughout evaluation, was oriented x 4 independently. Pt did not require cues to attend to evaluation tasks throughout session. Pt demonstrated adequate topic maintenance and reasoning skills throughout evaluation. Cognitive-linguistic skills WFL for the purpose of assessment and conversation.      Motor Speech/Fluency/Voice:  Pt's motor speech, fluency, and voice were informally assessed. OME performed within Cranial Nerve Assessment detailed below. Motor speech skills were assessed and were functional for daily communication with a variety of communication partners (i.e. Family, friends, medical personnel). " Patient endorses difficulty with s-blends. Patient's speech was c/b reduced vocal intensity, reduced breath-speech coordination and imprecise consonants. His speech was judged to be 100% intelligible with familiar and unfamiliar listeners in both known and unknown contexts however min articulatory imprecision was noted.   Respiration / Phonation:   # of reps/ 5s Norms/ # reps per second Maximum Phon. Time (ah) Words Per Minute:   P^ 30  5.0-7.1 6   Trial 1: 15     162 words per minute   T^ 23 4.8-7.1 4.6  Trial 2: 17 >125 = WFL    K^ 17 4.4-7.4 3.4   <125 = refer for AAC eval   P^T^K^ 6 3.6-7.5 1.2  Av       Norm: 160-170  (paragraph reading)       Norm (F 10-26, M 13-34.6) Norm: 150 to 250 (norm conversation)        Phonation Oral Reading Conversation   Stimulus Sustained ah:    Singing up scale:    Counting 1 to highest # on one breath: [employer] [place] History and Conversation   Quality clear clear clear   Duration  16 seconds  1 minute  N/a   Loudness  reduced vocal intensity reduced vocal intensity reduced vocal intensity   Steadiness WFL WFL WFL       Clinical Swallow Exam/ Oral Mechanism Exam:  Mandibular Strength and Mobility - Trigeminal Nerve (CNV) Rest: WFL   Lateralization: WFL  Protrusion: WFL  Retraction:  WFL  Involuntary Movement: absent   Oral Labial Strength and Mobility -  Facial Nerve (CN VII)  Rest: WFL   Protrusion: WFL  Retraction:  WFL  Involuntary Movement: absent    Lingual Strength and Mobility- Hypoglossal Nerve (CN XII)  Rest: WFL   Lateralization: WFL  Protrusion: WFL  Elevation:  WFL  Involuntary Movement: absent    Velar Elevation -   Glossopharyngeal Nerve (CN IX) and Vagus (CN X) Rest: WFL   Symmetry: WFL   Elevation: WFL   Sustained elevation: WFL   Involuntary movement: absent     Buccal Strength and Mobility -   Facial Nerve (CN VII)  WFL    Facial Sensation and Movement -   Facial Nerve (CN VII) Symmetrical at rest: yes  Wrinkle forehead: yes  Able to close eyes  tightly: yes  Taste to Anterior 2/3 of Tongue: yes however reports changes to taste      Swallowing: Patient denied dysphagia; denied coughing/choking with foods/liquids.     Hearing / Vision:  No concerns reported/observed regarding patient's hearing acuity or visual acuity skills.    Treatment   Treatment Time In: 11:41  Treatment Time Out: 11:51  Total Treatment Time: 10 minutes       Procedure Min.   Speech Language Voice Therapy 10       Education: Plan of Care, role of SLP in care, motor speech strategies, course of medical disease affect on therapy diagnosis , scheduling/ cancellation policy and insurance limitations / visit limit . Discussed diaphragmatic breathing and provided patient with examples of breathing exercises. Patient was able to demonstrate correct completion of diaphragmatic breathing. Discussed motor speech strategies at length and provided patient with specific examples of strategies. Patient able to provide demonstration of each motor speech strategy given initial model and min cues. Increased ability using motor speech strategies as motor speech tasks progressed. Motor speech strategies included: speak slowly / pacing (try to slow your rate of speech when talking while keeping the same intonation or sing-song quality that is natural to your voice), overarticulation (over-say all of the sounds in your words), speak loudly (make sure to project your voice so that others can hear you), deep breath (make sure to use deep breathing to support your speech. If you do not have enough breath support, it is difficult to over-articulate, speak loudly, or speak slowly), open your mouth (fredo sure to open your mouth widely while speaking). Discussed speech agility drills, HEP content, and HEP frequency/intensity. Patient expressed understanding of all discussed.     Home Program: read aloud practicing motor speech strategies, speech agility drills  Assessment   Kwan is a 80 y.o. male referred to  outpatient Speech Therapy with a medical diagnosis of Stroke. Patient presents with mild dysarthria c/b imprecise consonants, reduced vocal intensity, and reduced breath-speech coordination. Patient demonstrates word finding difficulty at conversational level, negatively affecting speech fluency. Demonstrates impairments including limitations as described in the problem list. Positive prognostic factors include patient motivation. Negative prognostic factors include none. No barriers to therapy identified.. Patient will benefit from skilled, outpatient neurological rehabilitation speech therapy.    Rehab Potential: good  Patient's spiritual, cultural and educational needs considered and patient agreeable to plan of care and goals.    Short Term Goals: (4 weeks)    1. Patient will rate their speech while reading as at least a 2 on a 3 point scale ( 1 = same as before beginning therapy, 2 =better but not best, 3 =best possible outcome) on  8 /10 trials.     2. Patient will differentiate between his speech and error-free speech by giving specific examples of differences on  8 /10 trials.     3. Patient will use motor speech strategies and answer questions in conversation with a self-rating of at least 2 on a 3 point scale ( 1 = same as before beginning therapy, 2 =better but not best, 3 =best possible outcome) on  8 /10 trials.    4. Patient will utilize easy onset when reading sentences on 8/10 trials given min cues    5. Patient will verbalize words with /s/ blends in all word positions with 90% acc independently to improve speech intelligibility     6. Patient will complete word finding tasks with semantic relationships (I.e. Similarities and differences, synonyms, antonyms, semantic category) with 90% acc independently to improve word finding.     7. Patient will complete semantic feature analysis (SFA) chart when given an object/verb with 90% acc given min cues to improve word finding abilities         Long Term  Goals (8 weeks):   1. He  will develop functional and intelligible speech and utilize compensatory strategies through the use of adequate labial and lingual function, increased articulatory precision and speech prosody.  2. He  will develop functional cognitive-linguistic based skills and utilize compensatory strategies to communicate wants and needs effectively to different conversational partners, maintain safety, and participate socially in functional living environment.        Plan   Plan of Care Certification: 11/11/2020  to 1/6/21  Recommended Treatment Plan:  Patient will participate in the [facility] neurological rehabilitation program for speech therapy 1 times per week to address his  Communication and Motor Speech deficits, to educate patient and their family, and to participate in a home exercise program.    Other Recommendations: none at this time    Therapist's Name:   JUANA Alexis, CCC-SLP  Speech Language Pathologist   11/11/2020

## 2020-11-17 NOTE — PROGRESS NOTES
Outpatient Neurological Rehabilitation   Speech and Language Therapy Treatment Note  Date:  11/20/2020     Name: Kwan Ramos   MRN: [id number]   Therapy Diagnosis:   Encounter Diagnoses   Name Primary?    Dysarthria Yes    Word finding difficulty    Physician: Digna Rousseau MD  Physician Orders: [id number] - Ambulatory consult to Speech Therapy  Medical Diagnosis from Referral: I63.9 (ICD-10-CM) - Stroke     Visit #/Visits authorized: 2/20  Date of Evaluation:  11/11/2020   Insurance Authorization Period: 11/09/2020-12/31/2020  Plan of Care Expiration:  11/11/2020 to 1/6/21   Extended POC:  n/a   Progress Note: due 12/20/2020   Visits Cancelled: 0  Visits No Show: 0    Time In:  7:47  Time Out:  8:28  Total Billable Time: 41 min     Precautions: Standard  Subjective:   Pt reports: that he went fishing this week and proved to himself that he could still do it. His brother and sister said that his speech is clearer and louder.  He reports that he will be attending [facility] in [place] with 4 family members and they will be observing all safety precautions; requesting to cx next weeks appointment as he will be out of town.   He was compliant to home exercise program. Reports that the exercises are helpful   Response to previous treatment: positive    Pain Scale:  0/10 on VAS currently.   Pain Location: n/a  Objective:        UNTIMED  Procedure Min.   Speech- Language- Voice Therapy  41   Total Timed Units: n/a  Total Untimed Units: 1  Charges Billed/# of units: 1      Short Term Goals: (4 weeks)  Current Progress:   1. Patient will rate their speech while reading as at least a 2 on a 3 point scale ( 1 = same as before beginning therapy, 2 =better but not best, 3 =best possible outcome) on  8 /10 trials.   Progressing/ Not Met 11/20/2020   Patient read 10+ word sentences and subsequently rated his speech as a:   1/3 on 0/10 trials  2/3 on 2/10 trials   3/3 on 8/10 trials  METx1   2. Patient will differentiate  between his speech and error-free speech by giving specific examples of differences on  8 /10 trials.   Progressing/ Not Met 2020   Patient differentiated between his speech and error-free speech by giving specific examples of differences on  10 /10 trials METx1   3. Patient will use motor speech strategies and answer questions in conversation with a self-rating of at least 2 on a 3 point scale ( 1 = same as before beginning therapy, 2 =better but not best, 3 =best possible outcome) on  8 /10 trials.  Progressing/ Not Met 2020    Not formally addressed     4. Patient will utilize easy onset when reading sentences on 8/10 trials given min cues  Progressing/ Not Met 2020   Not formally addressed as patient with good speech-breath coordination today   5. Patient will verbalize words with /s/ blends in all word positions with 90% acc independently to improve speech intelligibility   Progressing/ Not Met 2020   100% acc independently reading words with /s/ blends in various positions Goal Met / Discontinue      6. Patient will complete word finding tasks with semantic relationships (I.e. Similarities and differences, synonyms, antonyms, semantic category) with 90% acc independently to improve word finding.   Progressing/ Not Met 2020    word deduction: 80% acc independently; 100% acc given min cues    Convergent namin% acc independently    Adding 1 item to a category given 3 items within the category: 100% acc independently    Verbal fluency task: patient generated 17 items within a concrete category (occupations) within 1 miunte     7. Patient will complete semantic feature analysis (SFA) chart when given an object/verb with 90% acc given min cues to improve word finding abilities   Progressing/ Not Met 2020    Patient was observed to utilize SFA during moments of word finding difficulty at conversation level           Patient Education/Response:   Discussed motor speech  strategies and word finding strategies. Discussed increased effort needed to be clear. Patient verbalized understanding of all discussed.     Written Home Exercises Provided: yes. Word deduction sheets, speech agility exercises, reading aloud   Exercises were reviewed and Kwan was able to demonstrate them prior to the end of the session.  Kwan demonstrated good  understanding of the education provided.     See EMR under Patient Instructions for exercises provided prior visit.  Assessment:   Kwan is progressing well towards his goals. Very good performance with all motor speech tasks and good self monitoring/self correcting of verbal expression when needed. He did require some cues with complex word finding tasks but demonstrated ability to utilize word finding strategies when word finding difficulty occurred. Current goals remain appropriate. Goals to be updated as necessary.     Pt prognosis is Good. Pt will continue to benefit from skilled outpatient speech and language therapy to address the deficits listed in the problem list on initial evaluation, provide pt/family education and to maximize pt's level of independence in the home and community environment.   Medical necessity is demonstrated by the following IMPAIRMENTS:  - Decreased word finding skills and decreased speech intelligibility results in decreased efficiency communicating medical and social wants and needs in the case of emergency.  Barriers to Therapy: none  Pt's spiritual, cultural and educational needs considered and pt agreeable to plan of care and goals.  Plan:   Continue POC with focus on improving speech intelligibility     JUANA Alexis, CCC-SLP  Speech Language Pathologist   11/20/2020

## 2020-11-20 ENCOUNTER — CLINICAL SUPPORT (OUTPATIENT)
Dept: REHABILITATION | Facility: HOSPITAL | Age: 80
End: 2020-11-20
Attending: PSYCHIATRY & NEUROLOGY
Payer: MEDICARE

## 2020-11-20 DIAGNOSIS — R47.89 WORD FINDING DIFFICULTY: ICD-10-CM

## 2020-11-20 DIAGNOSIS — R47.1 DYSARTHRIA: Primary | ICD-10-CM

## 2020-11-20 PROCEDURE — 92507 TX SP LANG VOICE COMM INDIV: CPT | Mod: PN | Performed by: SPEECH-LANGUAGE PATHOLOGIST

## 2020-12-04 ENCOUNTER — HOME CARE VISIT (OUTPATIENT)
Dept: NEUROLOGY | Facility: HOSPITAL | Age: 80
End: 2020-12-04

## 2020-12-04 VITALS
BODY MASS INDEX: 25.25 KG/M2 | DIASTOLIC BLOOD PRESSURE: 70 MMHG | HEART RATE: 59 BPM | OXYGEN SATURATION: 99 % | SYSTOLIC BLOOD PRESSURE: 126 MMHG | WEIGHT: 171 LBS

## 2020-12-08 ENCOUNTER — TELEPHONE (OUTPATIENT)
Dept: CARDIOTHORACIC SURGERY | Facility: CLINIC | Age: 80
End: 2020-12-08

## 2020-12-08 NOTE — TELEPHONE ENCOUNTER
Called and scheduled pt to see Dr. Dunlap on 12/14 for TAA consult.  Pt informed that a warning message populated when I scheduled the appt, notifying me that the pt's insurance is out of network.  Pt informed of this and provided with the Central Pricing Office's phone number to better understand what he may expect to pay out of pocket for the visit, as I am unable to provide this information to him.  Pt verbalized understanding of his insurance being out of network and stated that he will call the financial office to learn more.  Pt verbalized understanding of appt.  Appt slip mailed to pt.    ----- Message from VICTOR HUGO Rodriguez sent at 12/8/2020  9:15 AM CST -----  Hi,        This should go to the Cardiac Surgery group. Thanks,  Iris         ----- Message -----  From: Tyrone Casillas  Sent: 12/8/2020   8:55 AM CST  To: [facility] Thoracic Surgery Clinical Support    Patient called to speak w/ someone regarding scheduling an appt from his referral by , requesting callback [phone]

## 2020-12-10 NOTE — PROGRESS NOTES
Outpatient Neurological Rehabilitation   Speech and Language Therapy Treatment Note  Date:  12/11/2020     Name: Kwan Ramos   MRN: [id number]   Therapy Diagnosis:   Encounter Diagnoses   Name Primary?    Dysarthria Yes    Word finding difficulty    Physician: Digna Rousseau MD  Physician Orders: [id number] - Ambulatory consult to Speech Therapy  Medical Diagnosis from Referral: I63.9 (ICD-10-CM) - Stroke     Visit #/Visits authorized: 3/20  Date of Evaluation:  11/11/2020   Insurance Authorization Period: 11/09/2020-12/31/2020  Plan of Care Expiration:  11/11/2020 to 1/6/21   Extended POC:  n/a   Progress Note: due 12/20/2020   Visits Cancelled: 0  Visits No Show: 0    Time In:  7:46   Time Out:  8:30  Total Billable Time: 44 min     Precautions: Standard  Subjective:   Pt reports: that he enjoyed his time with his family.  Reports that his speech is improved but improvement is going slower than he thought. Reports he is especially aware of his speech difficulties with his closest friends. Endorses improved word finding skills.   He was compliant to home exercise program; completing exercises everyday    Response to previous treatment: positive    Pain Scale:  0/10 on VAS currently.   Pain Location: n/a  Objective:        UNTIMED  Procedure Min.   Speech- Language- Voice Therapy 44    Total Timed Units: n/a  Total Untimed Units: 1  Charges Billed/# of units: 1      Short Term Goals: (4 weeks)  Current Progress:   1. Patient will rate their speech while reading as at least a 2 on a 3 point scale ( 1 = same as before beginning therapy, 2 =better but not best, 3 =best possible outcome) on  8 /10 trials.    Patient read paragraphs and subsequently rated his speech as a:   1/3 on 0/10 trials  2/3 on 2/10 trials   3/3 on 8/10 trials  METx2 Goal Met / Discontinue     2. Patient will differentiate between his speech and error-free speech by giving specific examples of differences on  8 /10 trials.    Patient  differentiated between his speech and error-free speech by giving specific examples of differences on  10 /10 trials METx2 Goal Met / Discontinue      3. Patient will use motor speech strategies and answer questions in conversation with a self-rating of at least 2 on a 3 point scale ( 1 = same as before beginning therapy, 2 =better but not best, 3 =best possible outcome) on  8 /10 trials.  Progressing/ Not Met 12/11/2020   Not formally addressed     4. Patient will utilize easy onset when reading sentences on 8/10 trials given min cues   Goal Not Met / Discontinue     5. Patient will verbalize words with /s/ blends in all word positions with 90% acc independently to improve speech intelligibility   Progressing/ Not Met 12/11/2020   Goal Met / Discontinue      6. Patient will complete word finding tasks with semantic relationships (I.e. Similarities and differences, synonyms, antonyms, semantic category) with 90% acc independently to improve word finding.   Progressing/ Not Met 12/11/2020   Generating synonyms: 100% acc independently    Generating antonyms: 100% acc independently    Adding 1 item to an abstract category given 3 items within the category: 90% acc independently    Verbal fluency task: patient generated 12  items within a concrete category (sports) within 1 miunte    Patient summarized a paragraph after reading it aloud with semantically, syntactically correct sentences with 100% acc independently METx1   7. Patient will complete semantic feature analysis (SFA) chart when given an object/verb with 90% acc given min cues to improve word finding abilities   Progressing/ Not Met 12/11/2020   Patient was observed to utilize SFA during moments of word finding difficulty at conversation level independently            Patient Education/Response:   Discussed motor speech strategies and importance of self monitoring and evaluating environment to determine levels of effort needed to be clear. Discussed sucking on  small, meltable mints to promote swallowing of saliva in order to decrease pooling of. Patient verbalized understanding of all discussed.     Written Home Exercises Provided: yes. Word deduction sheets, speech agility exercises, reading aloud   Exercises were reviewed and Kwan was able to demonstrate them prior to the end of the session.  Kwan demonstrated good  understanding of the education provided.     See EMR under Patient Instructions for exercises provided prior visit.  Assessment:   Kwan is progressing well towards his goals. Patient with good ability to utilize motor speech strategies when reading paragraphs and during spontaneous conversation. He rated his speech as at least a 2/3 on 10/10 trials, meeting short term goal 1. He demonstrated good self monitoring/self correcting of verbal expression when needed, meeting short term goal 2, but often identifies that his speech was not clear when it was 100% intelligible. Increased accuracy with complex word finding tasks today.  Current goals remain appropriate. Goals to be updated as necessary.     Pt prognosis is Good. Pt will continue to benefit from skilled outpatient speech and language therapy to address the deficits listed in the problem list on initial evaluation, provide pt/family education and to maximize pt's level of independence in the home and community environment.   Medical necessity is demonstrated by the following IMPAIRMENTS:  - Decreased word finding skills and decreased speech intelligibility results in decreased efficiency communicating medical and social wants and needs in the case of emergency.  Barriers to Therapy: none  Pt's spiritual, cultural and educational needs considered and pt agreeable to plan of care and goals.  Plan:   Continue POC with focus on improving speech intelligibility     JUANA Alexis, CCC-SLP  Speech Language Pathologist   12/11/2020

## 2020-12-11 ENCOUNTER — TELEPHONE (OUTPATIENT)
Dept: CARDIOTHORACIC SURGERY | Facility: CLINIC | Age: 80
End: 2020-12-11

## 2020-12-11 ENCOUNTER — CLINICAL SUPPORT (OUTPATIENT)
Dept: REHABILITATION | Facility: HOSPITAL | Age: 80
End: 2020-12-11
Attending: PSYCHIATRY & NEUROLOGY
Payer: MEDICARE

## 2020-12-11 DIAGNOSIS — R47.1 DYSARTHRIA: Primary | ICD-10-CM

## 2020-12-11 DIAGNOSIS — R47.89 WORD FINDING DIFFICULTY: ICD-10-CM

## 2020-12-11 PROCEDURE — 92507 TX SP LANG VOICE COMM INDIV: CPT | Mod: PN | Performed by: SPEECH-LANGUAGE PATHOLOGIST

## 2020-12-11 NOTE — TELEPHONE ENCOUNTER
Called to confirm pt's appt with Dr. Dunlap on 12/14. No answer; unable to leave a message.  Will attempt to call again this afternoon.

## 2020-12-11 NOTE — TELEPHONE ENCOUNTER
Called and confirmed pt's appt with Dr. Dunlap on 12/14. Pt verbalized understanding of appt time and location.

## 2020-12-14 ENCOUNTER — OFFICE VISIT (OUTPATIENT)
Dept: CARDIOTHORACIC SURGERY | Facility: CLINIC | Age: 80
End: 2020-12-14
Payer: MEDICARE

## 2020-12-14 VITALS
BODY MASS INDEX: 24.71 KG/M2 | TEMPERATURE: 99 F | SYSTOLIC BLOOD PRESSURE: 120 MMHG | HEART RATE: 58 BPM | HEIGHT: 70 IN | OXYGEN SATURATION: 95 % | WEIGHT: 172.63 LBS | DIASTOLIC BLOOD PRESSURE: 71 MMHG

## 2020-12-14 DIAGNOSIS — I35.1 AORTIC VALVE INSUFFICIENCY, ETIOLOGY OF CARDIAC VALVE DISEASE UNSPECIFIED: ICD-10-CM

## 2020-12-14 DIAGNOSIS — I71.21 ASCENDING AORTIC ANEURYSM: Primary | ICD-10-CM

## 2020-12-14 DIAGNOSIS — I71.21 ASCENDING AORTIC ANEURYSM: ICD-10-CM

## 2020-12-14 PROCEDURE — 1126F AMNT PAIN NOTED NONE PRSNT: CPT | Mod: S$GLB,,, | Performed by: THORACIC SURGERY (CARDIOTHORACIC VASCULAR SURGERY)

## 2020-12-14 PROCEDURE — 1126F PR PAIN SEVERITY QUANTIFIED, NO PAIN PRESENT: ICD-10-PCS | Mod: S$GLB,,, | Performed by: THORACIC SURGERY (CARDIOTHORACIC VASCULAR SURGERY)

## 2020-12-14 PROCEDURE — 99205 PR OFFICE/OUTPT VISIT, NEW, LEVL V, 60-74 MIN: ICD-10-PCS | Mod: S$GLB,,, | Performed by: THORACIC SURGERY (CARDIOTHORACIC VASCULAR SURGERY)

## 2020-12-14 PROCEDURE — 1159F MED LIST DOCD IN RCRD: CPT | Mod: S$GLB,,, | Performed by: THORACIC SURGERY (CARDIOTHORACIC VASCULAR SURGERY)

## 2020-12-14 PROCEDURE — 3288F PR FALLS RISK ASSESSMENT DOCUMENTED: ICD-10-PCS | Mod: CPTII,S$GLB,, | Performed by: THORACIC SURGERY (CARDIOTHORACIC VASCULAR SURGERY)

## 2020-12-14 PROCEDURE — 99999 PR PBB SHADOW E&M-EST. PATIENT-LVL IV: CPT | Mod: PBBFAC,,, | Performed by: THORACIC SURGERY (CARDIOTHORACIC VASCULAR SURGERY)

## 2020-12-14 PROCEDURE — 3288F FALL RISK ASSESSMENT DOCD: CPT | Mod: CPTII,S$GLB,, | Performed by: THORACIC SURGERY (CARDIOTHORACIC VASCULAR SURGERY)

## 2020-12-14 PROCEDURE — 99205 OFFICE O/P NEW HI 60 MIN: CPT | Mod: S$GLB,,, | Performed by: THORACIC SURGERY (CARDIOTHORACIC VASCULAR SURGERY)

## 2020-12-14 PROCEDURE — 1159F PR MEDICATION LIST DOCUMENTED IN MEDICAL RECORD: ICD-10-PCS | Mod: S$GLB,,, | Performed by: THORACIC SURGERY (CARDIOTHORACIC VASCULAR SURGERY)

## 2020-12-14 PROCEDURE — 1101F PT FALLS ASSESS-DOCD LE1/YR: CPT | Mod: CPTII,S$GLB,, | Performed by: THORACIC SURGERY (CARDIOTHORACIC VASCULAR SURGERY)

## 2020-12-14 PROCEDURE — 99999 PR PBB SHADOW E&M-EST. PATIENT-LVL IV: ICD-10-PCS | Mod: PBBFAC,,, | Performed by: THORACIC SURGERY (CARDIOTHORACIC VASCULAR SURGERY)

## 2020-12-14 PROCEDURE — 1101F PR PT FALLS ASSESS DOC 0-1 FALLS W/OUT INJ PAST YR: ICD-10-PCS | Mod: CPTII,S$GLB,, | Performed by: THORACIC SURGERY (CARDIOTHORACIC VASCULAR SURGERY)

## 2020-12-14 RX ORDER — MULTIVIT WITH MINERALS/HERBS
1 TABLET ORAL DAILY
COMMUNITY

## 2020-12-14 RX ORDER — AA/PROT/LYSINE/METHIO/VIT C/B6 50-12.5 MG
100 TABLET ORAL DAILY
COMMUNITY

## 2020-12-14 RX ORDER — GUAIFENESIN/PHENYLPROPANOLAMIN
4.5 EXPECTORANT ORAL DAILY
COMMUNITY

## 2020-12-14 RX ORDER — IBUPROFEN 100 MG/5ML
1000 SUSPENSION, ORAL (FINAL DOSE FORM) ORAL DAILY
COMMUNITY

## 2020-12-14 NOTE — PROGRESS NOTES
Subjective:      Patient ID: Kwan Ramos is a 80 y.o. male.    Chief Complaint: No chief complaint on file.      HPI:  Kwan Ramos is a 80 y.o. 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  He reports that the CT was done as part of the work up for his recent stroke.  He denies previous knowledge of aneurysm.  He reports that he stopped smoking in 2020 but reports he was a pack a day smoker for more than 10 years.  Current medical history reports a history of hypertension, however patient denies this.  He does report good blood pressures at home.       Family and social history reviewed    Review of patient's allergies indicates:  No Known Allergies  Past Medical History:   Diagnosis Date    Cancer     HTN (hypertension) 10/3/2020    MI (myocardial infarction)     pt reported     Past Surgical History:   Procedure Laterality Date    CARDIAC SURGERY       Family History     None        Social History     Socioeconomic History    Marital status:      Spouse name: Not on file    Number of children: Not on file    Years of education: Not on file    Highest education level: Not on file   Occupational History    Not on file   Social Needs    Financial resource strain: Not on file    Food insecurity     Worry: Not on file     Inability: Not on file    Transportation needs     Medical: Not on file     Non-medical: Not on file   Tobacco Use    Smoking status: Light Tobacco Smoker     Types: Cigarettes     Last attempt to quit: 2018     Years since quittin.6    Smokeless tobacco: Never Used    Tobacco comment: 2 cigarettes/day   Substance and Sexual Activity    Alcohol use: No    Drug use: No    Sexual activity: Never   Lifestyle     Physical activity     Days per week: Not on file     Minutes per session: Not on file    Stress: Not on file   Relationships    Social connections     Talks on phone: Not on file     Gets together: Not on file     Attends Sikh service: Not on file     Active member of club or organization: Not on file     Attends meetings of clubs or organizations: Not on file     Relationship status: Not on file   Other Topics Concern    Not on file   Social History Narrative    Not on file       Current Outpatient Medications:     abiraterone (ZYTIGA) 500 mg Tab, Take 1,000 mg by mouth once daily ., Disp: , Rfl:     aspirin (ECOTRIN) 81 MG EC tablet, Take 1 tablet (81 mg total) by mouth once daily., Disp: 30 tablet, Rfl: 0    atorvastatin (LIPITOR) 20 MG tablet, Take 1 tablet (20 mg total) by mouth once daily., Disp: 30 tablet, Rfl: 11    clopidogreL (PLAVIX) 75 mg tablet, Take 75 mg by mouth once daily., Disp: , Rfl:     FLUAD QUAD 2020-21,65Y UP,,PF, 60 mcg (15 mcg x 4)/0.5 mL Syrg, , Disp: , Rfl:     latanoprost 0.005 % ophthalmic solution, Place 1 drop into both eyes every evening., Disp: , Rfl:     metoprolol succinate (TOPROL-XL) 25 MG 24 hr tablet, Take 25 mg by mouth once daily., Disp: , Rfl:     metoprolol succinate 100 mg CSpX, Take 25 mg by mouth., Disp: , Rfl:     predniSONE (DELTASONE) 5 MG tablet, , Disp: , Rfl:     tamsulosin (FLOMAX) 0.4 mg Cap, Take 0.4 mg by mouth once daily., Disp: , Rfl:     tamsulosin (FLOMAX) 0.4 mg Cap, Take 0.4 mg by mouth., Disp: , Rfl:   Current medications Reviewed    Review of Systems   Constitutional: Negative for activity change, appetite change, fatigue and fever.   HENT: Negative for nosebleeds.    Respiratory: Negative for cough and shortness of breath.    Cardiovascular: Negative for chest pain, palpitations and leg swelling.   Gastrointestinal: Negative for abdominal distention, abdominal pain and nausea.   Genitourinary: Negative for frequency.    Musculoskeletal: Negative for arthralgias and myalgias.   Skin: Negative for rash.   Neurological: Negative for dizziness and numbness.   Hematological: Does not bruise/bleed easily.     Objective:   Physical Exam  Constitutional:       Appearance: He is well-developed.   HENT:      Head: Normocephalic and atraumatic.   Eyes:      Extraocular Movements: Extraocular movements intact.   Neck:      Musculoskeletal: Normal range of motion.   Cardiovascular:      Rate and Rhythm: Normal rate and regular rhythm.      Heart sounds: Normal heart sounds.   Pulmonary:      Effort: Pulmonary effort is normal.      Breath sounds: Normal breath sounds.   Abdominal:      Palpations: Abdomen is soft.   Musculoskeletal: Normal range of motion.   Skin:     General: Skin is warm and dry.      Capillary Refill: Capillary refill takes less than 2 seconds.   Neurological:      Mental Status: He is alert and oriented to person, place, and time.   Psychiatric:         Mood and Affect: Mood normal.         Behavior: Behavior normal.       Diagnostic Results: Reviewed   ECHO 10/3/2020  · The left ventricle is normal in size with normal systolic function. The estimated ejection fraction is 65%.  · There is left ventricular concentric remodeling.  · Normal left ventricular diastolic function.  · The ascending aorta is severely dilated at 4.9cm.  · Mild aortic regurgitation.  · Normal right ventricular systolic function.  · The estimated PA systolic pressure is 27 mmHg.  · Normal central venous pressure (3 mmHg).  · Mild left atrial enlargement.  CT Chest Non-Contrast 10/4/2020  Impression:  This report was flagged in Epic as abnormal.  1. Somewhat spiculated appearing pulmonary nodule within the right lower lobe measuring 1.2 cm.  There is adjacent tree-in-bud type nodularity.  Comparison with any previous examinations would be helpful, correlation with any history of malignancy recommended, or any current symptoms to suggest infection or  resolving infection.  For a solid nodule >8 mm, Fleischner Society 2017 guidelines recommend considering CT, PET/CT or tissue sampling at 3 months or sooner depending upon clinical suspicion.  Tree-in-bud type nodules are noted elsewhere, can be followed at the same time.  2. Ascending aortic aneurysm, noting no displacement of intimal calcification to suggest dissection.  3. Please see above for several additional findings.  Assessment:   1. TAA - 5.2cm  Plan:   Follow up in clinic in 6 months with repeat ECHO and CT.

## 2020-12-14 NOTE — LETTER
December 23, 2020      Digna Rousseau MD  [address]  [facility] [address]           [facility] 2nd Fl  [address]  [facility] [address]  Phone: [phone]          Patient: Kwan Ramos   MR Number: [id number]   YOB: 1940   Date of Visit: 12/14/2020       Dear Dr. Digna Rousseau:    Thank you for referring Kwan Ramos to me for evaluation. Attached you will find relevant portions of my assessment and plan of care.    If you have questions, please do not hesitate to call me. I look forward to following Kwan Ramos along with you.    Sincerely,    Dexter Dunlap MD    Enclosure  CC:  No Recipients    If you would like to receive this communication electronically, please contact [email or web address] or [phone] to request more information on [facility] [employer] Link access.    For providers and/or their staff who would like to refer a patient to [facility], please contact us through our one-stop-shop provider referral line, [facility], at [phone].    If you feel you have received this communication in error or would no longer like to receive these types of communications, please e-mail [email or web address]

## 2020-12-15 DIAGNOSIS — I71.21 ASCENDING AORTIC ANEURYSM: Primary | ICD-10-CM

## 2020-12-15 NOTE — PROGRESS NOTES
Outpatient Neurological Rehabilitation   Speech and Language Therapy Treatment Note  Date:  12/18/2020     Name: Kwan Ramos   MRN: [id number]   Therapy Diagnosis:   Encounter Diagnoses   Name Primary?    Dysarthria Yes    Word finding difficulty    Physician: Digna Rousseau MD  Physician Orders: [id number] - Ambulatory consult to Speech Therapy  Medical Diagnosis from Referral: I63.9 (ICD-10-CM) - Stroke     Visit #/Visits authorized: 4/20  Date of Evaluation:  11/11/2020   Insurance Authorization Period: 11/09/2020-12/31/2020  Plan of Care Expiration:  11/11/2020 to 1/6/21   Extended POC:  n/a   Progress Note: due 12/20/2020   Visits Cancelled: 0  Visits No Show: 0    Time In:  7:47 am  Time Out:  8:25 am  Total Billable Time: 38 min     Precautions: Standard  Subjective:   Pt reports: Endorses that drooling has reduced in amount and frequency. Reports that he talked to his son, sister, and ex-wife who all say that his speech is getting stronger and sounding better. Reports that some multi-syllabic words are difficult sometimes but overall he feels much more confident in his speech clarity.   He was compliant to home exercise program; completing exercises everyday    Response to previous treatment: positive    Pain Scale:  0/10 on VAS currently.   Pain Location: n/a  Objective:        UNTIMED  Procedure Min.   Speech- Language- Voice Therapy 38   Total Timed Units: n/a  Total Untimed Units: 1  Charges Billed/# of units: 1      Short Term Goals: (4 weeks)  Current Progress:   1. Patient will rate their speech while reading as at least a 2 on a 3 point scale ( 1 = same as before beginning therapy, 2 =better but not best, 3 =best possible outcome) on  8 /10 trials.    METx2 Goal Met / Discontinue     2. Patient will differentiate between his speech and error-free speech by giving specific examples of differences on  8 /10 trials.    METx2 Goal Met / Discontinue      3. Patient will use motor speech strategies and  "answer questions in conversation with a self-rating of at least 2 on a 3 point scale ( 1 = same as before beginning therapy, 2 =better but not best, 3 =best possible outcome) on  8 /10 trials.   Patient rated his speech while answering questions in conversation as a:  1/3 on 0/10 trials  2/3 on 0/10 trials  3/3 on 10/10 trials  Goal Met / Discontinue      4. Patient will utilize easy onset when reading sentences on 8/10 trials given min cues   Goal Not Met / Discontinue     5. Patient will verbalize words with /s/ blends in all word positions with 90% acc independently to improve speech intelligibility    Goal Met / Discontinue      6. Patient will complete word finding tasks with semantic relationships (I.e. Similarities and differences, synonyms, antonyms, semantic category) with 90% acc independently to improve word finding.    Patient participated in conversation with little to no evidence of word finding difficulty. Patient reports he uses word finding strategies when needed.  METx2 Goal Met / Discontinue      7. Patient will complete semantic feature analysis (SFA) chart when given an object/verb with 90% acc given min cues to improve word finding abilities    Patient was observed to utilize SFA during moments of word finding difficulty at conversation level independently METx2 Goal Met / Discontinue              Patient Education/Response:   Ongoing discussion re: motor speech strategies and importance of self monitoring and evaluating environment to determine levels of effort needed to be clear. Discussed determining "natural human errors" vs "dyarthric errors" due to stroke. Discussed discharge and recommendations.  Patient verbalized understanding of all discussed.     Written Home Exercises Provided: Reading aloud and rating speech    See EMR under Patient Instructions for exercises provided prior visit.  Assessment:   Kwan is progressing well towards his goals. Patient has demonstrated good ability to " utilize motor speech strategies when reading paragraphs and during spontaneous conversation independently. He rated his speech as  a 3/3 on 10/10 trials within conversation, meeting short term goal 3. Patient participated in conversation with little to no evidence of word finding difficulty. Patient reports he uses word finding strategies when needed. Patient has met all goals. Patient is discharged from skilled speech services.  Medical necessity is demonstrated by the following IMPAIRMENTS:  - Decreased word finding skills and decreased speech intelligibility results in decreased efficiency communicating medical and social wants and needs in the case of emergency.  Barriers to Therapy: none  Pt's spiritual, cultural and educational needs considered and pt agreeable to plan of care and goals.  Plan:   Discharge from skilled ST services     JUANA Alexis, CCC-SLP  Speech Language Pathologist   12/18/2020

## 2020-12-18 ENCOUNTER — CLINICAL SUPPORT (OUTPATIENT)
Dept: REHABILITATION | Facility: HOSPITAL | Age: 80
End: 2020-12-18
Attending: PSYCHIATRY & NEUROLOGY
Payer: MEDICARE

## 2020-12-18 DIAGNOSIS — R47.89 WORD FINDING DIFFICULTY: ICD-10-CM

## 2020-12-18 DIAGNOSIS — R47.1 DYSARTHRIA: Primary | ICD-10-CM

## 2020-12-18 PROCEDURE — 92507 TX SP LANG VOICE COMM INDIV: CPT | Mod: PN | Performed by: SPEECH-LANGUAGE PATHOLOGIST

## 2020-12-18 NOTE — PLAN OF CARE
Outpatient Therapy Discharge Summary   Discharge Date: 12/18/2020   Name: Kwan Ramos  [facility] Number: [id number]  Therapy Diagnosis:   Encounter Diagnoses   Name Primary?    Dysarthria Yes    Word finding difficulty      Physician: Digna Rousseau MD  Physician Orders: [id number] - Ambulatory consult to Speech Therapy  Medical Diagnosis from Referral: I63.9 (ICD-10-CM) - Stroke  Evaluation Date: 11/11/2020  Date of Last visit: 12/18/2020  Total Visits Received: 4  Cancelled Visits: 1  No Show Visits: 0    Assessment    Assessment of Current Status: Patient has displayed good ability to independently utilize motor speech strategies at the conversation level. He has demonstrated 100% speech intelligibility with a variety of speaking tasks with no articulatory errors. Kwan has demonstrated ability to self monitor and self correct his verbal output as appropriate. Patient able to utilize word finding strategies independently and is able to express his wants/needs clearly without difficulty.     Goals:   Short Term Goals: (4 weeks)  Current Progress:   1. Patient will rate their speech while reading as at least a 2 on a 3 point scale ( 1 = same as before beginning therapy, 2 =better but not best, 3 =best possible outcome) on  8 /10 trials.    METx2 Goal Met / Discontinue     2. Patient will differentiate between his speech and error-free speech by giving specific examples of differences on  8 /10 trials.    METx2 Goal Met / Discontinue      3. Patient will use motor speech strategies and answer questions in conversation with a self-rating of at least 2 on a 3 point scale ( 1 = same as before beginning therapy, 2 =better but not best, 3 =best possible outcome) on  8 /10 trials.   Goal Met / Discontinue      4. Patient will utilize easy onset when reading sentences on 8/10 trials given min cues   Goal Not Met / Discontinue     5. Patient will verbalize words with /s/ blends in all word positions with 90% acc  independently to improve speech intelligibility    Goal Met / Discontinue      6. Patient will complete word finding tasks with semantic relationships (I.e. Similarities and differences, synonyms, antonyms, semantic category) with 90% acc independently to improve word finding.    METx2 Goal Met / Discontinue      7. Patient will complete semantic feature analysis (SFA) chart when given an object/verb with 90% acc given min cues to improve word finding abilities     Goal Met / Discontinue            Long Term Goals:  1. He  will develop functional and intelligible speech and utilize compensatory strategies through the use of adequate labial and lingual function, increased articulatory precision and speech prosody. Goal Met / Discontinue   2. He  will develop functional cognitive-linguistic based skills and utilize compensatory strategies to communicate wants and needs effectively to different conversational partners, maintain safety, and participate socially in functional living environment.  Goal Met / Discontinue       Discharge reason: Patient has met all of his goals    Plan   This patient is discharged from Speech Therapy    JUANA Alexis, CCC-SLP  Speech Language Pathologist   12/18/2020

## 2021-01-10 ENCOUNTER — IMMUNIZATION (OUTPATIENT)
Dept: OBSTETRICS AND GYNECOLOGY | Facility: CLINIC | Age: 81
End: 2021-01-10
Payer: MEDICARE

## 2021-01-10 DIAGNOSIS — Z23 NEED FOR VACCINATION: ICD-10-CM

## 2021-01-10 PROCEDURE — 91300 COVID-19, MRNA, LNP-S, PF, 30 MCG/0.3 ML DOSE VACCINE: CPT | Mod: PBBFAC | Performed by: FAMILY MEDICINE

## 2021-01-11 ENCOUNTER — HOME CARE VISIT (OUTPATIENT)
Dept: NEUROLOGY | Facility: HOSPITAL | Age: 81
End: 2021-01-11

## 2021-01-31 ENCOUNTER — IMMUNIZATION (OUTPATIENT)
Dept: OBSTETRICS AND GYNECOLOGY | Facility: CLINIC | Age: 81
End: 2021-01-31
Payer: MEDICARE

## 2021-01-31 DIAGNOSIS — Z23 NEED FOR VACCINATION: Primary | ICD-10-CM

## 2021-01-31 PROCEDURE — 91300 COVID-19, MRNA, LNP-S, PF, 30 MCG/0.3 ML DOSE VACCINE: CPT | Mod: S$GLB,,, | Performed by: FAMILY MEDICINE

## 2021-01-31 PROCEDURE — 0002A COVID-19, MRNA, LNP-S, PF, 30 MCG/0.3 ML DOSE VACCINE: ICD-10-PCS | Mod: CV19,S$GLB,, | Performed by: FAMILY MEDICINE

## 2021-01-31 PROCEDURE — 0002A COVID-19, MRNA, LNP-S, PF, 30 MCG/0.3 ML DOSE VACCINE: CPT | Mod: CV19,S$GLB,, | Performed by: FAMILY MEDICINE

## 2021-01-31 PROCEDURE — 91300 COVID-19, MRNA, LNP-S, PF, 30 MCG/0.3 ML DOSE VACCINE: ICD-10-PCS | Mod: S$GLB,,, | Performed by: FAMILY MEDICINE

## 2021-02-17 ENCOUNTER — HOME CARE VISIT (OUTPATIENT)
Dept: NEUROLOGY | Facility: HOSPITAL | Age: 81
End: 2021-02-17

## 2021-03-08 ENCOUNTER — TELEPHONE (OUTPATIENT)
Dept: NEUROLOGY | Facility: CLINIC | Age: 81
End: 2021-03-08

## 2021-03-16 ENCOUNTER — HOME CARE VISIT (OUTPATIENT)
Dept: NEUROLOGY | Facility: HOSPITAL | Age: 81
End: 2021-03-16

## 2021-04-19 ENCOUNTER — HOME CARE VISIT (OUTPATIENT)
Dept: NEUROLOGY | Facility: HOSPITAL | Age: 81
End: 2021-04-19

## 2021-05-28 ENCOUNTER — TELEPHONE (OUTPATIENT)
Dept: CARDIOTHORACIC SURGERY | Facility: CLINIC | Age: 81
End: 2021-05-28

## 2021-06-18 ENCOUNTER — TELEPHONE (OUTPATIENT)
Dept: CARDIOTHORACIC SURGERY | Facility: CLINIC | Age: 81
End: 2021-06-18

## 2021-06-21 ENCOUNTER — HOSPITAL ENCOUNTER (OUTPATIENT)
Dept: CARDIOLOGY | Facility: HOSPITAL | Age: 81
Discharge: HOME | End: 2021-06-21
Attending: THORACIC SURGERY (CARDIOTHORACIC VASCULAR SURGERY)
Payer: MEDICARE

## 2021-06-21 ENCOUNTER — OFFICE VISIT (OUTPATIENT)
Dept: CARDIOTHORACIC SURGERY | Facility: CLINIC | Age: 81
End: 2021-06-21
Attending: THORACIC SURGERY (CARDIOTHORACIC VASCULAR SURGERY)
Payer: MEDICARE

## 2021-06-21 ENCOUNTER — HOSPITAL ENCOUNTER (OUTPATIENT)
Dept: RADIOLOGY | Facility: HOSPITAL | Age: 81
Discharge: HOME | End: 2021-06-21
Attending: THORACIC SURGERY (CARDIOTHORACIC VASCULAR SURGERY)
Payer: MEDICARE

## 2021-06-21 VITALS
DIASTOLIC BLOOD PRESSURE: 73 MMHG | SYSTOLIC BLOOD PRESSURE: 149 MMHG | TEMPERATURE: 98 F | OXYGEN SATURATION: 95 % | BODY MASS INDEX: 26.34 KG/M2 | HEIGHT: 70 IN | HEART RATE: 51 BPM | WEIGHT: 184 LBS

## 2021-06-21 VITALS
DIASTOLIC BLOOD PRESSURE: 80 MMHG | HEIGHT: 70 IN | BODY MASS INDEX: 24.62 KG/M2 | SYSTOLIC BLOOD PRESSURE: 140 MMHG | WEIGHT: 172 LBS | HEART RATE: 52 BPM

## 2021-06-21 DIAGNOSIS — I35.1 AORTIC VALVE INSUFFICIENCY, ETIOLOGY OF CARDIAC VALVE DISEASE UNSPECIFIED: ICD-10-CM

## 2021-06-21 DIAGNOSIS — I71.21 ASCENDING AORTIC ANEURYSM: ICD-10-CM

## 2021-06-21 DIAGNOSIS — I71.21 ASCENDING AORTIC ANEURYSM: Primary | ICD-10-CM

## 2021-06-21 LAB
ASCENDING AORTA: 4.9 CM
AV INDEX (PROSTH): 0.88
AV MEAN GRADIENT: 3 MMHG
AV PEAK GRADIENT: 5 MMHG
AV VALVE AREA: 3.78 CM2
AV VELOCITY RATIO: 0.8
BSA FOR ECHO PROCEDURE: 1.96 M2
CV ECHO LV RWT: 0.33 CM
DOP CALC AO PEAK VEL: 1.17 M/S
DOP CALC AO VTI: 25.18 CM
DOP CALC LVOT AREA: 4.3 CM2
DOP CALC LVOT DIAMETER: 2.34 CM
DOP CALC LVOT PEAK VEL: 0.94 M/S
DOP CALC LVOT STROKE VOLUME: 95.21 CM3
DOP CALCLVOT PEAK VEL VTI: 22.15 CM
E WAVE DECELERATION TIME: 228.96 MSEC
E/A RATIO: 1.09
E/E' RATIO: 10 M/S
ECHO LV POSTERIOR WALL: 0.61 CM (ref 0.6–1.1)
EJECTION FRACTION: 55 %
FRACTIONAL SHORTENING: 33 % (ref 28–44)
INTERVENTRICULAR SEPTUM: 1.04 CM (ref 0.6–1.1)
IVRT: 98.95 MSEC
LA MAJOR: 5.35 CM
LA MINOR: 5.28 CM
LA WIDTH: 4.06 CM
LEFT ATRIUM SIZE: 3.25 CM
LEFT ATRIUM VOLUME INDEX MOD: 24.5 ML/M2
LEFT ATRIUM VOLUME INDEX: 30.4 ML/M2
LEFT ATRIUM VOLUME MOD: 47.97 CM3
LEFT ATRIUM VOLUME: 59.61 CM3
LEFT INTERNAL DIMENSION IN SYSTOLE: 2.51 CM (ref 2.1–4)
LEFT VENTRICLE DIASTOLIC VOLUME INDEX: 30.22 ML/M2
LEFT VENTRICLE DIASTOLIC VOLUME: 59.24 ML
LEFT VENTRICLE MASS INDEX: 44 G/M2
LEFT VENTRICLE SYSTOLIC VOLUME INDEX: 11.6 ML/M2
LEFT VENTRICLE SYSTOLIC VOLUME: 22.64 ML
LEFT VENTRICULAR INTERNAL DIMENSION IN DIASTOLE: 3.73 CM (ref 3.5–6)
LEFT VENTRICULAR MASS: 86.98 G
LV LATERAL E/E' RATIO: 8.57 M/S
LV SEPTAL E/E' RATIO: 12 M/S
MV A" WAVE DURATION": 16.75 MSEC
MV PEAK A VEL: 0.55 M/S
MV PEAK E VEL: 0.6 M/S
MV STENOSIS PRESSURE HALF TIME: 66.4 MS
MV VALVE AREA P 1/2 METHOD: 3.31 CM2
PISA TR MAX VEL: 2.3 M/S
PULM VEIN S/D RATIO: 1.22
PV PEAK D VEL: 0.32 M/S
PV PEAK S VEL: 0.39 M/S
RA MAJOR: 5.48 CM
RA PRESSURE: 3 MMHG
RA WIDTH: 4.03 CM
RIGHT VENTRICULAR END-DIASTOLIC DIMENSION: 3.01 CM
RV TISSUE DOPPLER FREE WALL SYSTOLIC VELOCITY 1 (APICAL 4 CHAMBER VIEW): 9.11 CM/S
SINUS: 3.67 CM
STJ: 3.52 CM
TDI LATERAL: 0.07 M/S
TDI SEPTAL: 0.05 M/S
TDI: 0.06 M/S
TR MAX PG: 21 MMHG
TRICUSPID ANNULAR PLANE SYSTOLIC EXCURSION: 1.94 CM
TV REST PULMONARY ARTERY PRESSURE: 24 MMHG

## 2021-06-21 PROCEDURE — 3288F PR FALLS RISK ASSESSMENT DOCUMENTED: ICD-10-PCS | Mod: CPTII,S$GLB,, | Performed by: THORACIC SURGERY (CARDIOTHORACIC VASCULAR SURGERY)

## 2021-06-21 PROCEDURE — 93306 TTE W/DOPPLER COMPLETE: CPT

## 2021-06-21 PROCEDURE — 1101F PR PT FALLS ASSESS DOC 0-1 FALLS W/OUT INJ PAST YR: ICD-10-PCS | Mod: CPTII,S$GLB,, | Performed by: THORACIC SURGERY (CARDIOTHORACIC VASCULAR SURGERY)

## 2021-06-21 PROCEDURE — 99999 PR PBB SHADOW E&M-EST. PATIENT-LVL IV: ICD-10-PCS | Mod: PBBFAC,,, | Performed by: THORACIC SURGERY (CARDIOTHORACIC VASCULAR SURGERY)

## 2021-06-21 PROCEDURE — 93306 TTE W/DOPPLER COMPLETE: CPT | Mod: 26,,, | Performed by: INTERNAL MEDICINE

## 2021-06-21 PROCEDURE — 99214 OFFICE O/P EST MOD 30 MIN: CPT | Mod: S$GLB,,, | Performed by: THORACIC SURGERY (CARDIOTHORACIC VASCULAR SURGERY)

## 2021-06-21 PROCEDURE — 99999 PR PBB SHADOW E&M-EST. PATIENT-LVL IV: CPT | Mod: PBBFAC,,, | Performed by: THORACIC SURGERY (CARDIOTHORACIC VASCULAR SURGERY)

## 2021-06-21 PROCEDURE — 1159F PR MEDICATION LIST DOCUMENTED IN MEDICAL RECORD: ICD-10-PCS | Mod: S$GLB,,, | Performed by: THORACIC SURGERY (CARDIOTHORACIC VASCULAR SURGERY)

## 2021-06-21 PROCEDURE — 3288F FALL RISK ASSESSMENT DOCD: CPT | Mod: CPTII,S$GLB,, | Performed by: THORACIC SURGERY (CARDIOTHORACIC VASCULAR SURGERY)

## 2021-06-21 PROCEDURE — 1101F PT FALLS ASSESS-DOCD LE1/YR: CPT | Mod: CPTII,S$GLB,, | Performed by: THORACIC SURGERY (CARDIOTHORACIC VASCULAR SURGERY)

## 2021-06-21 PROCEDURE — 1159F MED LIST DOCD IN RCRD: CPT | Mod: S$GLB,,, | Performed by: THORACIC SURGERY (CARDIOTHORACIC VASCULAR SURGERY)

## 2021-06-21 PROCEDURE — 99214 PR OFFICE/OUTPT VISIT, EST, LEVL IV, 30-39 MIN: ICD-10-PCS | Mod: S$GLB,,, | Performed by: THORACIC SURGERY (CARDIOTHORACIC VASCULAR SURGERY)

## 2021-06-21 PROCEDURE — 1126F PR PAIN SEVERITY QUANTIFIED, NO PAIN PRESENT: ICD-10-PCS | Mod: S$GLB,,, | Performed by: THORACIC SURGERY (CARDIOTHORACIC VASCULAR SURGERY)

## 2021-06-21 PROCEDURE — 93306 ECHO (CUPID ONLY): ICD-10-PCS | Mod: 26,,, | Performed by: INTERNAL MEDICINE

## 2021-06-21 PROCEDURE — 71250 CT THORAX DX C-: CPT | Mod: 26,,, | Performed by: RADIOLOGY

## 2021-06-21 PROCEDURE — 1126F AMNT PAIN NOTED NONE PRSNT: CPT | Mod: S$GLB,,, | Performed by: THORACIC SURGERY (CARDIOTHORACIC VASCULAR SURGERY)

## 2021-06-21 PROCEDURE — 71250 CT CHEST WITHOUT CONTRAST: ICD-10-PCS | Mod: 26,,, | Performed by: RADIOLOGY

## 2021-06-21 PROCEDURE — 71250 CT THORAX DX C-: CPT | Mod: TC

## 2021-09-28 ENCOUNTER — IMMUNIZATION (OUTPATIENT)
Dept: OBSTETRICS AND GYNECOLOGY | Facility: CLINIC | Age: 81
End: 2021-09-28
Payer: MEDICARE

## 2021-09-28 DIAGNOSIS — Z23 NEED FOR VACCINATION: Primary | ICD-10-CM

## 2021-09-28 PROCEDURE — 91300 COVID-19, MRNA, LNP-S, PF, 30 MCG/0.3 ML DOSE VACCINE: CPT | Mod: PBBFAC | Performed by: FAMILY MEDICINE

## 2021-09-28 PROCEDURE — 0003A COVID-19, MRNA, LNP-S, PF, 30 MCG/0.3 ML DOSE VACCINE: CPT | Mod: PBBFAC | Performed by: FAMILY MEDICINE

## 2021-10-18 ENCOUNTER — IMMUNIZATION (OUTPATIENT)
Dept: PHARMACY | Facility: CLINIC | Age: 81
End: 2021-10-18
Payer: MEDICARE